# Patient Record
Sex: MALE | Race: OTHER | NOT HISPANIC OR LATINO
[De-identification: names, ages, dates, MRNs, and addresses within clinical notes are randomized per-mention and may not be internally consistent; named-entity substitution may affect disease eponyms.]

---

## 2017-03-13 ENCOUNTER — APPOINTMENT (OUTPATIENT)
Dept: INTERNAL MEDICINE | Facility: CLINIC | Age: 52
End: 2017-03-13

## 2017-03-13 VITALS
SYSTOLIC BLOOD PRESSURE: 152 MMHG | WEIGHT: 160 LBS | BODY MASS INDEX: 24.25 KG/M2 | HEIGHT: 68 IN | DIASTOLIC BLOOD PRESSURE: 84 MMHG | HEART RATE: 90 BPM | TEMPERATURE: 97.7 F | OXYGEN SATURATION: 96 %

## 2017-03-15 LAB
ALBUMIN SERPL ELPH-MCNC: 4.5 G/DL
ALP BLD-CCNC: 46 U/L
ALT SERPL-CCNC: 31 U/L
AMYLASE/CREAT SERPL: 83 U/L
ANION GAP SERPL CALC-SCNC: 20 MMOL/L
AST SERPL-CCNC: 25 U/L
BASOPHILS # BLD AUTO: 0.03 K/UL
BASOPHILS NFR BLD AUTO: 0.4 %
BILIRUB SERPL-MCNC: 0.4 MG/DL
BUN SERPL-MCNC: 12 MG/DL
CALCIUM SERPL-MCNC: 9.5 MG/DL
CHLORIDE SERPL-SCNC: 97 MMOL/L
CO2 SERPL-SCNC: 22 MMOL/L
CREAT SERPL-MCNC: 1.08 MG/DL
EOSINOPHIL # BLD AUTO: 0.3 K/UL
EOSINOPHIL NFR BLD AUTO: 4.2 %
ERYTHROCYTE [SEDIMENTATION RATE] IN BLOOD BY WESTERGREN METHOD: 2 MM/HR
GLUCOSE SERPL-MCNC: 74 MG/DL
HCT VFR BLD CALC: 42.7 %
HGB BLD-MCNC: 15.2 G/DL
IMM GRANULOCYTES NFR BLD AUTO: 0.1 %
LPL SERPL-CCNC: 34 U/L
LYMPHOCYTES # BLD AUTO: 3.83 K/UL
LYMPHOCYTES NFR BLD AUTO: 53 %
MAN DIFF?: NORMAL
MCHC RBC-ENTMCNC: 31.1 PG
MCHC RBC-ENTMCNC: 35.6 GM/DL
MCV RBC AUTO: 87.5 FL
MONOCYTES # BLD AUTO: 0.52 K/UL
MONOCYTES NFR BLD AUTO: 7.2 %
NEUTROPHILS # BLD AUTO: 2.53 K/UL
NEUTROPHILS NFR BLD AUTO: 35.1 %
PLATELET # BLD AUTO: 275 K/UL
POTASSIUM SERPL-SCNC: 4.2 MMOL/L
PROT SERPL-MCNC: 7.7 G/DL
RBC # BLD: 4.88 M/UL
RBC # FLD: 12.6 %
SAVE SPECIMEN: NORMAL
SODIUM SERPL-SCNC: 139 MMOL/L
WBC # FLD AUTO: 7.22 K/UL

## 2017-03-17 LAB
C DIFF TOX GENS STL QL NAA+PROBE: NORMAL
CDIFF BY PCR: NOT DETECTED
DEPRECATED O AND P PREP STL: NORMAL
G LAMBLIA AG STL QL: NORMAL
RV AG STL QL IA: NORMAL
WRIGHT STN STL: NORMAL

## 2017-03-20 LAB — BACTERIA STL CULT: NORMAL

## 2017-10-09 ENCOUNTER — APPOINTMENT (OUTPATIENT)
Dept: CARDIOLOGY | Facility: CLINIC | Age: 52
End: 2017-10-09
Payer: COMMERCIAL

## 2017-10-09 ENCOUNTER — LABORATORY RESULT (OUTPATIENT)
Age: 52
End: 2017-10-09

## 2017-10-09 VITALS — HEART RATE: 78 BPM | OXYGEN SATURATION: 96 % | TEMPERATURE: 98.1 F

## 2017-10-09 VITALS — DIASTOLIC BLOOD PRESSURE: 82 MMHG | SYSTOLIC BLOOD PRESSURE: 138 MMHG

## 2017-10-09 VITALS — DIASTOLIC BLOOD PRESSURE: 84 MMHG | SYSTOLIC BLOOD PRESSURE: 140 MMHG

## 2017-10-09 VITALS — DIASTOLIC BLOOD PRESSURE: 86 MMHG | SYSTOLIC BLOOD PRESSURE: 142 MMHG

## 2017-10-09 PROCEDURE — 36415 COLL VENOUS BLD VENIPUNCTURE: CPT

## 2017-10-09 PROCEDURE — 99214 OFFICE O/P EST MOD 30 MIN: CPT

## 2017-10-09 PROCEDURE — 93040 RHYTHM ECG WITH REPORT: CPT | Mod: 59

## 2017-10-09 PROCEDURE — 93000 ELECTROCARDIOGRAM COMPLETE: CPT

## 2017-10-10 ENCOUNTER — NON-APPOINTMENT (OUTPATIENT)
Age: 52
End: 2017-10-10

## 2017-10-26 ENCOUNTER — APPOINTMENT (OUTPATIENT)
Dept: CARDIOLOGY | Facility: CLINIC | Age: 52
End: 2017-10-26
Payer: COMMERCIAL

## 2017-10-26 ENCOUNTER — APPOINTMENT (OUTPATIENT)
Dept: CARDIOLOGY | Facility: CLINIC | Age: 52
End: 2017-10-26

## 2017-10-26 PROCEDURE — 93306 TTE W/DOPPLER COMPLETE: CPT

## 2017-10-26 PROCEDURE — 93015 CV STRESS TEST SUPVJ I&R: CPT

## 2017-10-29 LAB
ALBUMIN SERPL ELPH-MCNC: 4.6 G/DL
ALP BLD-CCNC: 51 U/L
ALT SERPL-CCNC: 28 U/L
AMYLASE/CREAT SERPL: 84 U/L
ANION GAP SERPL CALC-SCNC: 12 MMOL/L
AST SERPL-CCNC: 18 U/L
BASOPHILS # BLD AUTO: 0.02 K/UL
BASOPHILS NFR BLD AUTO: 0.3 %
BILIRUB SERPL-MCNC: 0.4 MG/DL
BUN SERPL-MCNC: 15 MG/DL
CALCIUM SERPL-MCNC: 9.2 MG/DL
CHLORIDE SERPL-SCNC: 102 MMOL/L
CHOLEST SERPL-MCNC: 244 MG/DL
CHOLEST/HDLC SERPL: 5.3 RATIO
CO2 SERPL-SCNC: 27 MMOL/L
CREAT SERPL-MCNC: 1.03 MG/DL
EOSINOPHIL # BLD AUTO: 0.28 K/UL
EOSINOPHIL NFR BLD AUTO: 4.5 %
GLUCOSE SERPL-MCNC: 90 MG/DL
HCT VFR BLD CALC: 42.4 %
HDLC SERPL-MCNC: 46 MG/DL
HGB BLD-MCNC: 14.4 G/DL
IMM GRANULOCYTES NFR BLD AUTO: 0 %
LDLC SERPL CALC-MCNC: 119 MG/DL
LDLC SERPL DIRECT ASSAY-MCNC: 146 MG/DL
LYMPHOCYTES # BLD AUTO: 3.21 K/UL
LYMPHOCYTES NFR BLD AUTO: 51.9 %
MAN DIFF?: NORMAL
MCHC RBC-ENTMCNC: 30.4 PG
MCHC RBC-ENTMCNC: 34 GM/DL
MCV RBC AUTO: 89.6 FL
MONOCYTES # BLD AUTO: 0.39 K/UL
MONOCYTES NFR BLD AUTO: 6.3 %
NEUTROPHILS # BLD AUTO: 2.28 K/UL
NEUTROPHILS NFR BLD AUTO: 37 %
PLATELET # BLD AUTO: 252 K/UL
POTASSIUM SERPL-SCNC: 4.7 MMOL/L
PROT SERPL-MCNC: 7.5 G/DL
RBC # BLD: 4.73 M/UL
RBC # FLD: 13.4 %
SODIUM SERPL-SCNC: 141 MMOL/L
T3 SERPL-MCNC: 81 NG/DL
T3RU NFR SERPL: 0.99 INDEX
T4 FREE SERPL-MCNC: 1.3 NG/DL
T4 SERPL-MCNC: 6.8 UG/DL
TRIGL SERPL-MCNC: 394 MG/DL
TSH SERPL-ACNC: 2.2 UIU/ML
WBC # FLD AUTO: 6.18 K/UL

## 2017-10-30 ENCOUNTER — APPOINTMENT (OUTPATIENT)
Dept: CARDIOLOGY | Facility: CLINIC | Age: 52
End: 2017-10-30

## 2017-11-02 ENCOUNTER — APPOINTMENT (OUTPATIENT)
Dept: CARDIOLOGY | Facility: CLINIC | Age: 52
End: 2017-11-02

## 2017-11-19 ENCOUNTER — RX RENEWAL (OUTPATIENT)
Age: 52
End: 2017-11-19

## 2018-01-08 ENCOUNTER — MESSAGE (OUTPATIENT)
Age: 53
End: 2018-01-08

## 2018-01-22 ENCOUNTER — APPOINTMENT (OUTPATIENT)
Dept: CARDIOLOGY | Facility: CLINIC | Age: 53
End: 2018-01-22

## 2018-02-13 ENCOUNTER — APPOINTMENT (OUTPATIENT)
Dept: CARDIOLOGY | Facility: CLINIC | Age: 53
End: 2018-02-13
Payer: COMMERCIAL

## 2018-02-13 VITALS
WEIGHT: 160 LBS | BODY MASS INDEX: 24.33 KG/M2 | HEART RATE: 81 BPM | DIASTOLIC BLOOD PRESSURE: 86 MMHG | SYSTOLIC BLOOD PRESSURE: 129 MMHG

## 2018-02-13 PROCEDURE — 99213 OFFICE O/P EST LOW 20 MIN: CPT | Mod: 25

## 2018-02-13 PROCEDURE — 93351 STRESS TTE COMPLETE: CPT

## 2018-02-19 LAB
ALBUMIN SERPL ELPH-MCNC: 4.4 G/DL
ALP BLD-CCNC: 47 U/L
ALT SERPL-CCNC: 22 U/L
ANION GAP SERPL CALC-SCNC: 17 MMOL/L
AST SERPL-CCNC: 22 U/L
BILIRUB SERPL-MCNC: 0.4 MG/DL
BUN SERPL-MCNC: 16 MG/DL
CALCIUM SERPL-MCNC: 9.2 MG/DL
CHLORIDE SERPL-SCNC: 96 MMOL/L
CHOLEST SERPL-MCNC: 236 MG/DL
CHOLEST/HDLC SERPL: 5.6 RATIO
CO2 SERPL-SCNC: 24 MMOL/L
CREAT SERPL-MCNC: 1.32 MG/DL
GLUCOSE SERPL-MCNC: 82 MG/DL
HDLC SERPL-MCNC: 42 MG/DL
LDLC SERPL CALC-MCNC: 126 MG/DL
LDLC SERPL DIRECT ASSAY-MCNC: 141 MG/DL
POTASSIUM SERPL-SCNC: 4.1 MMOL/L
PROT SERPL-MCNC: 8 G/DL
SODIUM SERPL-SCNC: 137 MMOL/L
TRIGL SERPL-MCNC: 339 MG/DL

## 2018-12-15 ENCOUNTER — RX RENEWAL (OUTPATIENT)
Age: 53
End: 2018-12-15

## 2020-05-07 ENCOUNTER — LABORATORY RESULT (OUTPATIENT)
Age: 55
End: 2020-05-07

## 2020-05-07 ENCOUNTER — APPOINTMENT (OUTPATIENT)
Dept: CARDIOLOGY | Facility: CLINIC | Age: 55
End: 2020-05-07
Payer: COMMERCIAL

## 2020-05-07 ENCOUNTER — NON-APPOINTMENT (OUTPATIENT)
Age: 55
End: 2020-05-07

## 2020-05-07 VITALS
SYSTOLIC BLOOD PRESSURE: 130 MMHG | DIASTOLIC BLOOD PRESSURE: 88 MMHG | BODY MASS INDEX: 27.37 KG/M2 | WEIGHT: 180 LBS | HEART RATE: 92 BPM | TEMPERATURE: 98.1 F | OXYGEN SATURATION: 96 %

## 2020-05-07 DIAGNOSIS — Z12.5 ENCOUNTER FOR SCREENING FOR MALIGNANT NEOPLASM OF PROSTATE: ICD-10-CM

## 2020-05-07 PROCEDURE — 36415 COLL VENOUS BLD VENIPUNCTURE: CPT

## 2020-05-07 PROCEDURE — 99214 OFFICE O/P EST MOD 30 MIN: CPT

## 2020-05-07 PROCEDURE — 93000 ELECTROCARDIOGRAM COMPLETE: CPT

## 2020-05-13 LAB
ALBUMIN SERPL ELPH-MCNC: 4.8 G/DL
ALP BLD-CCNC: 69 U/L
ALT SERPL-CCNC: 31 U/L
ANION GAP SERPL CALC-SCNC: 12 MMOL/L
AST SERPL-CCNC: 17 U/L
BASOPHILS # BLD AUTO: 0.05 K/UL
BASOPHILS NFR BLD AUTO: 0.7 %
BILIRUB SERPL-MCNC: 0.5 MG/DL
BUN SERPL-MCNC: 13 MG/DL
CALCIUM SERPL-MCNC: 9.8 MG/DL
CHLORIDE SERPL-SCNC: 101 MMOL/L
CHOLEST SERPL-MCNC: 155 MG/DL
CHOLEST/HDLC SERPL: 3.1 RATIO
CO2 SERPL-SCNC: 29 MMOL/L
CREAT SERPL-MCNC: 0.99 MG/DL
EOSINOPHIL # BLD AUTO: 0.24 K/UL
EOSINOPHIL NFR BLD AUTO: 3.6 %
GLUCOSE SERPL-MCNC: 111 MG/DL
HCT VFR BLD CALC: 42.4 %
HDLC SERPL-MCNC: 50 MG/DL
HGB BLD-MCNC: 14.8 G/DL
IMM GRANULOCYTES NFR BLD AUTO: 0.1 %
LDLC SERPL CALC-MCNC: 37 MG/DL
LDLC SERPL DIRECT ASSAY-MCNC: 66 MG/DL
LYMPHOCYTES # BLD AUTO: 2.96 K/UL
LYMPHOCYTES NFR BLD AUTO: 44.2 %
MAN DIFF?: NORMAL
MCHC RBC-ENTMCNC: 30.7 PG
MCHC RBC-ENTMCNC: 34.9 GM/DL
MCV RBC AUTO: 88 FL
MONOCYTES # BLD AUTO: 0.53 K/UL
MONOCYTES NFR BLD AUTO: 7.9 %
NEUTROPHILS # BLD AUTO: 2.91 K/UL
NEUTROPHILS NFR BLD AUTO: 43.5 %
PLATELET # BLD AUTO: 263 K/UL
POTASSIUM SERPL-SCNC: 4.4 MMOL/L
PROT SERPL-MCNC: 7.4 G/DL
PSA SERPL-MCNC: 0.5 NG/ML
RBC # BLD: 4.82 M/UL
RBC # FLD: 12.2 %
SODIUM SERPL-SCNC: 142 MMOL/L
T3 SERPL-MCNC: 87 NG/DL
T3RU NFR SERPL: 1.1 TBI
T4 FREE SERPL-MCNC: 1.3 NG/DL
T4 SERPL-MCNC: 6.8 UG/DL
TRIGL SERPL-MCNC: 342 MG/DL
TSH SERPL-ACNC: 1.82 UIU/ML
WBC # FLD AUTO: 6.7 K/UL

## 2020-05-28 NOTE — REASON FOR VISIT
[Follow-Up - Clinic] : a clinic follow-up of [Hyperlipidemia] : hyperlipidemia [Hypertension] : hypertension [Syncope] : syncope

## 2020-05-28 NOTE — HISTORY OF PRESENT ILLNESS
[FreeTextEntry1] : Patient presents to reestablish cardiac care with me..  \par Losartan had been increased from 50 daily to 100 daily, and he was started on hydrochlorothiazide and atorvastatin.  \par He has gained 10 to 15 pounds.  \par He was admitted to [facility] in September 2019 with an episode of syncope following initiation of a new medication.\par He denies chest pain, shortness of breath, palpitations, and dizziness.

## 2020-06-15 ENCOUNTER — APPOINTMENT (OUTPATIENT)
Dept: CARDIOLOGY | Facility: CLINIC | Age: 55
End: 2020-06-15

## 2020-10-29 ENCOUNTER — TRANSCRIPTION ENCOUNTER (OUTPATIENT)
Age: 55
End: 2020-10-29

## 2020-11-13 ENCOUNTER — TRANSCRIPTION ENCOUNTER (OUTPATIENT)
Age: 55
End: 2020-11-13

## 2021-03-02 ENCOUNTER — APPOINTMENT (OUTPATIENT)
Dept: CT IMAGING | Facility: IMAGING CENTER | Age: 56
End: 2021-03-02
Payer: COMMERCIAL

## 2021-03-02 ENCOUNTER — OUTPATIENT (OUTPATIENT)
Dept: OUTPATIENT SERVICES | Facility: HOSPITAL | Age: 56
LOS: 1 days | End: 2021-03-02
Payer: COMMERCIAL

## 2021-03-02 DIAGNOSIS — C02.1 MALIGNANT NEOPLASM OF BORDER OF TONGUE: ICD-10-CM

## 2021-03-02 PROCEDURE — 82565 ASSAY OF CREATININE: CPT

## 2021-03-02 PROCEDURE — 70487 CT MAXILLOFACIAL W/DYE: CPT | Mod: 26

## 2021-03-02 PROCEDURE — 70491 CT SOFT TISSUE NECK W/DYE: CPT

## 2021-03-02 PROCEDURE — 70491 CT SOFT TISSUE NECK W/DYE: CPT | Mod: 26

## 2021-03-02 PROCEDURE — 70487 CT MAXILLOFACIAL W/DYE: CPT

## 2021-03-04 ENCOUNTER — OUTPATIENT (OUTPATIENT)
Dept: OUTPATIENT SERVICES | Facility: HOSPITAL | Age: 56
LOS: 1 days | End: 2021-03-04
Payer: COMMERCIAL

## 2021-03-04 VITALS
DIASTOLIC BLOOD PRESSURE: 80 MMHG | WEIGHT: 173.94 LBS | RESPIRATION RATE: 16 BRPM | TEMPERATURE: 96 F | HEART RATE: 90 BPM | SYSTOLIC BLOOD PRESSURE: 130 MMHG | OXYGEN SATURATION: 99 % | HEIGHT: 64 IN

## 2021-03-04 DIAGNOSIS — I10 ESSENTIAL (PRIMARY) HYPERTENSION: ICD-10-CM

## 2021-03-04 DIAGNOSIS — C02.1 MALIGNANT NEOPLASM OF BORDER OF TONGUE: ICD-10-CM

## 2021-03-04 DIAGNOSIS — Z98.890 OTHER SPECIFIED POSTPROCEDURAL STATES: Chronic | ICD-10-CM

## 2021-03-04 LAB
ALBUMIN SERPL ELPH-MCNC: 4.6 G/DL — SIGNIFICANT CHANGE UP (ref 3.3–5)
ALP SERPL-CCNC: 70 U/L — SIGNIFICANT CHANGE UP (ref 40–120)
ALT FLD-CCNC: 30 U/L — SIGNIFICANT CHANGE UP (ref 4–41)
ANION GAP SERPL CALC-SCNC: 10 MMOL/L — SIGNIFICANT CHANGE UP (ref 7–14)
AST SERPL-CCNC: 18 U/L — SIGNIFICANT CHANGE UP (ref 4–40)
BILIRUB SERPL-MCNC: 0.6 MG/DL — SIGNIFICANT CHANGE UP (ref 0.2–1.2)
BLD GP AB SCN SERPL QL: NEGATIVE — SIGNIFICANT CHANGE UP
BUN SERPL-MCNC: 15 MG/DL — SIGNIFICANT CHANGE UP (ref 7–23)
CALCIUM SERPL-MCNC: 9 MG/DL — SIGNIFICANT CHANGE UP (ref 8.4–10.5)
CHLORIDE SERPL-SCNC: 100 MMOL/L — SIGNIFICANT CHANGE UP (ref 98–107)
CO2 SERPL-SCNC: 28 MMOL/L — SIGNIFICANT CHANGE UP (ref 22–31)
CREAT SERPL-MCNC: 0.99 MG/DL — SIGNIFICANT CHANGE UP (ref 0.5–1.3)
GLUCOSE SERPL-MCNC: 85 MG/DL — SIGNIFICANT CHANGE UP (ref 70–99)
POTASSIUM SERPL-MCNC: 4.2 MMOL/L — SIGNIFICANT CHANGE UP (ref 3.5–5.3)
POTASSIUM SERPL-SCNC: 4.2 MMOL/L — SIGNIFICANT CHANGE UP (ref 3.5–5.3)
PROT SERPL-MCNC: 7.5 G/DL — SIGNIFICANT CHANGE UP (ref 6–8.3)
RH IG SCN BLD-IMP: POSITIVE — SIGNIFICANT CHANGE UP
SODIUM SERPL-SCNC: 138 MMOL/L — SIGNIFICANT CHANGE UP (ref 135–145)

## 2021-03-04 PROCEDURE — 93010 ELECTROCARDIOGRAM REPORT: CPT

## 2021-03-04 RX ORDER — LOSARTAN POTASSIUM 100 MG/1
0 TABLET, FILM COATED ORAL
Qty: 0 | Refills: 0 | DISCHARGE

## 2021-03-04 RX ORDER — ATORVASTATIN CALCIUM 80 MG/1
1 TABLET, FILM COATED ORAL
Qty: 0 | Refills: 0 | DISCHARGE

## 2021-03-04 NOTE — H&P PST ADULT - NEGATIVE CARDIOVASCULAR SYMPTOMS
Patient with refills on file, note sent to the pharmacy.  
no chest pain/no palpitations/no dyspnea on exertion/no peripheral edema

## 2021-03-04 NOTE — H&P PST ADULT - NSICDXPROBLEM_GEN_ALL_CORE_FT
PROBLEM DIAGNOSES  Problem: Malignant neoplasm of border of tongue  Assessment and Plan: preop for right selective neck dissection, right partial glossectomy on 3/11/21  preop instructions given, pt verbalized understanding  GI prophylaxis and chlorhexidine wash provided  pt informed COVID testing must be done 72 hours prior to surgery  YEMI precautions, OR booking notified via fax      Problem: HTN (hypertension)  Assessment and Plan: pt will take blood pressure meds Amlodipine and Losartan AM of surgery as prescribed

## 2021-03-04 NOTE — H&P PST ADULT - NEGATIVE ENMT SYMPTOMS
no hearing difficulty/no ear pain/no tinnitus/no vertigo/no sinus symptoms/no nose bleeds/no gum bleeding/no throat pain/no dysphagia

## 2021-03-04 NOTE — H&P PST ADULT - NSANTHOSAYNRD_GEN_A_CORE
No. YEMI screening performed.  STOP BANG Legend: 0-2 = LOW Risk; 3-4 = INTERMEDIATE Risk; 5-8 = HIGH Risk

## 2021-03-04 NOTE — H&P PST ADULT - NSICDXPASTMEDICALHX_GEN_ALL_CORE_FT
PAST MEDICAL HISTORY:  HTN (hypertension)     Hypercholesteremia     Malignant neoplasm of border of tongue     Mitral regurgitation     Near syncope 9/2019 after starting new blood pressure medication.  pt states cardiac testing was WNL    Premature ventricular contraction

## 2021-03-04 NOTE — H&P PST ADULT - HISTORY OF PRESENT ILLNESS
56 y/o male presents to [facility] preop for right selective neck dissection, right partial glossectomy. preop dx of malignant neoplasm of border of tongue. pt presented to the dentist reporting burning sensation to tongue after eating spicy foods x 8 months. pt saw oral surgeon Dr. Beckwith and had a biopsy performed. biopsy results confirmed malignancy.

## 2021-03-08 ENCOUNTER — APPOINTMENT (OUTPATIENT)
Dept: DISASTER EMERGENCY | Facility: CLINIC | Age: 56
End: 2021-03-08

## 2021-03-09 ENCOUNTER — NON-APPOINTMENT (OUTPATIENT)
Age: 56
End: 2021-03-09

## 2021-03-09 ENCOUNTER — APPOINTMENT (OUTPATIENT)
Dept: CARDIOLOGY | Facility: CLINIC | Age: 56
End: 2021-03-09
Payer: COMMERCIAL

## 2021-03-09 ENCOUNTER — RESULT CHARGE (OUTPATIENT)
Age: 56
End: 2021-03-09

## 2021-03-09 VITALS
WEIGHT: 176 LBS | HEART RATE: 75 BPM | SYSTOLIC BLOOD PRESSURE: 130 MMHG | OXYGEN SATURATION: 99 % | BODY MASS INDEX: 26.76 KG/M2 | TEMPERATURE: 97 F | DIASTOLIC BLOOD PRESSURE: 84 MMHG

## 2021-03-09 DIAGNOSIS — Z01.818 ENCOUNTER FOR OTHER PREPROCEDURAL EXAMINATION: ICD-10-CM

## 2021-03-09 DIAGNOSIS — Z87.19 PERSONAL HISTORY OF OTHER DISEASES OF THE DIGESTIVE SYSTEM: ICD-10-CM

## 2021-03-09 DIAGNOSIS — M25.562 PAIN IN LEFT KNEE: ICD-10-CM

## 2021-03-09 DIAGNOSIS — Z87.898 PERSONAL HISTORY OF OTHER SPECIFIED CONDITIONS: ICD-10-CM

## 2021-03-09 DIAGNOSIS — R94.2 ABNORMAL RESULTS OF PULMONARY FUNCTION STUDIES: ICD-10-CM

## 2021-03-09 DIAGNOSIS — Z87.2 PERSONAL HISTORY OF DISEASES OF THE SKIN AND SUBCUTANEOUS TISSUE: ICD-10-CM

## 2021-03-09 DIAGNOSIS — R10.13 EPIGASTRIC PAIN: ICD-10-CM

## 2021-03-09 PROBLEM — R55 SYNCOPE AND COLLAPSE: Chronic | Status: ACTIVE | Noted: 2021-03-04

## 2021-03-09 PROBLEM — I10 ESSENTIAL (PRIMARY) HYPERTENSION: Chronic | Status: ACTIVE | Noted: 2021-03-04

## 2021-03-09 PROBLEM — I49.3 VENTRICULAR PREMATURE DEPOLARIZATION: Chronic | Status: ACTIVE | Noted: 2021-03-04

## 2021-03-09 PROBLEM — C02.1: Chronic | Status: ACTIVE | Noted: 2021-03-04

## 2021-03-09 PROBLEM — E78.00 PURE HYPERCHOLESTEROLEMIA, UNSPECIFIED: Chronic | Status: ACTIVE | Noted: 2021-03-04

## 2021-03-09 PROBLEM — I34.0 NONRHEUMATIC MITRAL (VALVE) INSUFFICIENCY: Chronic | Status: ACTIVE | Noted: 2021-03-04

## 2021-03-09 LAB — SARS-COV-2 N GENE NPH QL NAA+PROBE: NOT DETECTED

## 2021-03-09 PROCEDURE — 99072 ADDL SUPL MATRL&STAF TM PHE: CPT

## 2021-03-09 PROCEDURE — 99214 OFFICE O/P EST MOD 30 MIN: CPT

## 2021-03-09 PROCEDURE — 93000 ELECTROCARDIOGRAM COMPLETE: CPT

## 2021-03-09 PROCEDURE — 93040 RHYTHM ECG WITH REPORT: CPT | Mod: 59

## 2021-03-10 ENCOUNTER — NON-APPOINTMENT (OUTPATIENT)
Age: 56
End: 2021-03-10

## 2021-03-10 PROBLEM — Z01.818 PRE-OP EXAM: Status: RESOLVED | Noted: 2021-03-07 | Resolved: 2021-03-10

## 2021-03-10 PROBLEM — Z87.19 HISTORY OF GASTROENTERITIS: Status: RESOLVED | Noted: 2017-03-13 | Resolved: 2021-03-10

## 2021-03-10 PROBLEM — R10.13 EPIGASTRIC DISCOMFORT: Status: RESOLVED | Noted: 2017-10-09 | Resolved: 2021-03-10

## 2021-03-10 RX ORDER — HYDROCHLOROTHIAZIDE 25 MG/1
25 TABLET ORAL DAILY
Qty: 90 | Refills: 3 | Status: DISCONTINUED | COMMUNITY
End: 2021-03-10

## 2021-03-10 NOTE — H&P ADULT - HISTORY OF PRESENT ILLNESS
54 y/o male presents to [facility] for right selective neck dissection, right partial glossectomy after biopsy/ diagnosis of oral cancer with Dr. Soliman in OR under [place] on 03/10/2021.

## 2021-03-10 NOTE — H&P ADULT - NSHPPHYSICALEXAM_GEN_ALL_CORE
Physical Exam:  · Constitutional		well-developed; well-groomed; well-nourished; no distress  · Eyes		PERRL; EOMI; conjunctiva clear  · ENMT	mouth	normal mouth and gums; moist  · ENMT Comments	ulcerated lesion to right lateral tongue  · Neck		normal; supple; no JVD; full ROM of neck  · Breasts           normal shape  · Back		normal shape; ROM intact; strength intact  · Respiratory	detailed exam  · Respiratory Details	airway patent; breath sounds equal; respirations non-labored; clear to auscultation bilaterally; no wheezes  · Cardiovascular	           regular rate and rhythm  positive S1; positive S2  · Gastrointestinal	soft; nontender; no masses palpable; bowel sounds normal  · Genitourinary	not examined   · Rectal	not examined   · Extremities	 	no pedal edema  · Vascular	detailed exam   · Radial Pulse	right normal; left normal  · Neurological	Alert & oriented; no sensory, motor or coordination deficits, normal reflexes  · Skin	No lesions; no rash  · Lymph Nodes		no cervical lymphadenopathy palpated  · Musculoskeletal		ROM intact; no calf tenderness; normal strength  · Psychiatric	Affect and characteristics of appearance, verbalizations, behaviors are appropriate

## 2021-03-10 NOTE — HISTORY OF PRESENT ILLNESS
[FreeTextEntry1] : He was admitted to [facility] in September 2019 for syncope following initiation of a new medication; he has been unable to determine the identity of the medication.\par He stopped taking hydrochlorothiazide since his last visit with me in May 2020.  \par He walks 1-2 miles per day without any symptoms, but does not formally exercise.\par Review of systems is negative.  Specifically, he denies chest pain, shortness of breath, palpitations, and dizziness.\par Prior cardiac work-up includes the following:\par Echocardiogram (10/26/2017) - LVEF 65-70%, mild MR\par Stress echocardiogram (2/13/2018) - 10'45" seconds Eric protocol (12 METS); 1.8 mm downsloping ST depressions III and 1.2 mm horizontal ST depressions aVF at peak exercise, resolving by 1 minute recovery; no echocardiographic evidence of ischemia.\par

## 2021-03-10 NOTE — H&P ADULT - NSHPREVIEWOFSYSTEMS_GEN_ALL_CORE
Review of Systems:   · Negative General Symptoms	no fever; no chills; no sweating; no weight loss; no fatigue  · Skin/Breast	negative  · Ophthalmologic	negative  · Negative ENMT Symptoms	no hearing difficulty; no ear pain; no tinnitus; no vertigo; no sinus symptoms; no nose bleeds; no gum bleeding; no throat pain; no dysphagia  · Negative Respiratory and Thorax Symptoms	no wheezing; no dyspnea; no cough; no hemoptysis  · Negative Cardiovascular Symptoms	no chest pain; no palpitations; no dyspnea on exertion; no peripheral edema  · Gastrointestinal	negative  · Genitourinary	negative  · Musculoskeletal	negative  · Negative Neurological Symptoms	no transient paralysis; no weakness; no paresthesias; no generalized seizures; no focal seizures; no syncope; no tremors; no vertigo; no loss of sensation; no difficulty walking; no headache; no loss of consciousness; no hemiparesis; no confusion; no facial palsy  · Psychiatric	negative  · Hematology/Lymphatics	negative  · Endocrine Comments	denies thyroid disease or DM  · Allergic/Immunologic	negative

## 2021-03-10 NOTE — H&P ADULT - NSHPSOCIALHISTORY_GEN_ALL_CORE
Social History:  · Marital Status	  · Occupation	networking services  · Lives With	children; spouse     Substance Use History:  · Substance Use	caffeine  · Caffeine Type	coffee  · Caffeine Amount/Frequency	1-2 cups/cans per day  · Substance Use Comment	denies drug use     Alcohol Use History:  · Have you ever consumed alcohol	yes...  · Alcohol Type	beer; wine; liquor  · Alcohol Frequency	2-4 times/mo  1x/month  · Alcohol Amount	1-2 drinks  · Problems Related to Alcohol Use	no  · 1. Have you felt you ought to CUT down on your drinking?	no  · 2. Have people ANNOYED you by criticizing your drinking?	no  · 3. Have you ever felt bad or GUILTY about your drinking?	no  · 4. Have you ever needed an "EYE OPENER", a drink first thing in the morning to steady your nerves or get rid of a hangover?	no     Tobacco Usage:  · Tobacco Usage: Never smoker     Passive Smoke Exposure:  · Passive Smoke Exposure	No

## 2021-03-10 NOTE — H&P ADULT - ASSESSMENT
56 y/o male presents to [facility] for right selective neck dissection, right partial glossectomy after biopsy/ diagnosis of oral cancer with Dr. Soliman in OR under [place] on 03/10/2021.

## 2021-03-10 NOTE — REASON FOR VISIT
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

## 2021-03-10 NOTE — ASU PATIENT PROFILE, ADULT - PMH
HTN (hypertension)    Hypercholesteremia    Malignant neoplasm of border of tongue    Mitral regurgitation    Near syncope  9/2019 after starting new blood pressure medication.  pt states cardiac testing was WNL  Premature ventricular contraction

## 2021-03-11 ENCOUNTER — INPATIENT (INPATIENT)
Facility: HOSPITAL | Age: 56
LOS: 2 days | Discharge: HOME | End: 2021-03-14
Attending: DENTIST | Admitting: OTOLARYNGOLOGY
Payer: COMMERCIAL

## 2021-03-11 ENCOUNTER — RESULT REVIEW (OUTPATIENT)
Age: 56
End: 2021-03-11

## 2021-03-11 VITALS
SYSTOLIC BLOOD PRESSURE: 123 MMHG | RESPIRATION RATE: 16 BRPM | OXYGEN SATURATION: 99 % | TEMPERATURE: 98 F | HEART RATE: 70 BPM | HEIGHT: 64 IN | DIASTOLIC BLOOD PRESSURE: 81 MMHG | WEIGHT: 173.94 LBS

## 2021-03-11 DIAGNOSIS — Z98.890 OTHER SPECIFIED POSTPROCEDURAL STATES: Chronic | ICD-10-CM

## 2021-03-11 DIAGNOSIS — C02.1 MALIGNANT NEOPLASM OF BORDER OF TONGUE: ICD-10-CM

## 2021-03-11 PROCEDURE — 88309 TISSUE EXAM BY PATHOLOGIST: CPT | Mod: 26

## 2021-03-11 PROCEDURE — 88331 PATH CONSLTJ SURG 1 BLK 1SPC: CPT | Mod: 26

## 2021-03-11 PROCEDURE — 88332 PATH CONSLTJ SURG EA ADD BLK: CPT | Mod: 26

## 2021-03-11 RX ORDER — ACETAMINOPHEN 500 MG
1000 TABLET ORAL EVERY 6 HOURS
Refills: 0 | Status: COMPLETED | OUTPATIENT
Start: 2021-03-11 | End: 2021-03-12

## 2021-03-11 RX ORDER — AMLODIPINE BESYLATE 2.5 MG/1
1 TABLET ORAL
Qty: 0 | Refills: 0 | DISCHARGE

## 2021-03-11 RX ORDER — ATORVASTATIN CALCIUM 80 MG/1
1 TABLET, FILM COATED ORAL
Qty: 0 | Refills: 0 | DISCHARGE

## 2021-03-11 RX ORDER — LOSARTAN POTASSIUM 100 MG/1
0 TABLET, FILM COATED ORAL
Qty: 0 | Refills: 0 | DISCHARGE

## 2021-03-11 RX ORDER — FENTANYL CITRATE 50 UG/ML
50 INJECTION INTRAVENOUS
Refills: 0 | Status: DISCONTINUED | OUTPATIENT
Start: 2021-03-11 | End: 2021-03-12

## 2021-03-11 RX ORDER — AMPICILLIN SODIUM AND SULBACTAM SODIUM 250; 125 MG/ML; MG/ML
3 INJECTION, POWDER, FOR SUSPENSION INTRAMUSCULAR; INTRAVENOUS ONCE
Refills: 0 | Status: COMPLETED | OUTPATIENT
Start: 2021-03-11 | End: 2021-03-11

## 2021-03-11 RX ORDER — ENOXAPARIN SODIUM 100 MG/ML
40 INJECTION SUBCUTANEOUS ONCE
Refills: 0 | Status: DISCONTINUED | OUTPATIENT
Start: 2021-03-11 | End: 2021-03-11

## 2021-03-11 RX ORDER — SODIUM CHLORIDE 9 MG/ML
1000 INJECTION, SOLUTION INTRAVENOUS
Refills: 0 | Status: DISCONTINUED | OUTPATIENT
Start: 2021-03-11 | End: 2021-03-12

## 2021-03-11 RX ORDER — AMPICILLIN SODIUM AND SULBACTAM SODIUM 250; 125 MG/ML; MG/ML
3 INJECTION, POWDER, FOR SUSPENSION INTRAMUSCULAR; INTRAVENOUS EVERY 6 HOURS
Refills: 0 | Status: DISCONTINUED | OUTPATIENT
Start: 2021-03-11 | End: 2021-03-14

## 2021-03-11 RX ORDER — AMPICILLIN SODIUM AND SULBACTAM SODIUM 250; 125 MG/ML; MG/ML
INJECTION, POWDER, FOR SUSPENSION INTRAMUSCULAR; INTRAVENOUS
Refills: 0 | Status: DISCONTINUED | OUTPATIENT
Start: 2021-03-11 | End: 2021-03-14

## 2021-03-11 RX ORDER — HYDROMORPHONE HYDROCHLORIDE 2 MG/ML
0.5 INJECTION INTRAMUSCULAR; INTRAVENOUS; SUBCUTANEOUS
Refills: 0 | Status: DISCONTINUED | OUTPATIENT
Start: 2021-03-11 | End: 2021-03-12

## 2021-03-11 RX ORDER — DEXAMETHASONE 0.5 MG/5ML
10 ELIXIR ORAL ONCE
Refills: 0 | Status: COMPLETED | OUTPATIENT
Start: 2021-03-11 | End: 2021-03-11

## 2021-03-11 RX ORDER — METOCLOPRAMIDE HCL 10 MG
10 TABLET ORAL ONCE
Refills: 0 | Status: DISCONTINUED | OUTPATIENT
Start: 2021-03-11 | End: 2021-03-12

## 2021-03-11 RX ORDER — HYDROMORPHONE HYDROCHLORIDE 2 MG/ML
1 INJECTION INTRAMUSCULAR; INTRAVENOUS; SUBCUTANEOUS
Refills: 0 | Status: DISCONTINUED | OUTPATIENT
Start: 2021-03-11 | End: 2021-03-12

## 2021-03-11 RX ADMIN — SODIUM CHLORIDE 75 MILLILITER(S): 9 INJECTION, SOLUTION INTRAVENOUS at 14:45

## 2021-03-11 RX ADMIN — FENTANYL CITRATE 50 MICROGRAM(S): 50 INJECTION INTRAVENOUS at 17:37

## 2021-03-11 RX ADMIN — AMPICILLIN SODIUM AND SULBACTAM SODIUM 200 GRAM(S): 250; 125 INJECTION, POWDER, FOR SUSPENSION INTRAMUSCULAR; INTRAVENOUS at 18:57

## 2021-03-11 RX ADMIN — Medication 102 MILLIGRAM(S): at 20:34

## 2021-03-11 RX ADMIN — FENTANYL CITRATE 50 MICROGRAM(S): 50 INJECTION INTRAVENOUS at 17:07

## 2021-03-11 RX ADMIN — FENTANYL CITRATE 50 MICROGRAM(S): 50 INJECTION INTRAVENOUS at 18:15

## 2021-03-11 RX ADMIN — FENTANYL CITRATE 50 MICROGRAM(S): 50 INJECTION INTRAVENOUS at 20:04

## 2021-03-11 RX ADMIN — FENTANYL CITRATE 50 MICROGRAM(S): 50 INJECTION INTRAVENOUS at 17:45

## 2021-03-11 RX ADMIN — FENTANYL CITRATE 50 MICROGRAM(S): 50 INJECTION INTRAVENOUS at 20:15

## 2021-03-11 NOTE — BRIEF OPERATIVE NOTE - NSICDXBRIEFPROCEDURE_GEN_ALL_CORE_FT
PROCEDURES:  Modified radical neck dissection 11-Mar-2021 17:17:33  López Sandoval  Partial glossectomy 11-Mar-2021 17:16:58  López Sandoval

## 2021-03-11 NOTE — CONSULT NOTE ADULT - ASSESSMENT
55M hx of HTN, HLD, mitral regurgitation, and tongue cancer now s/p elective R partial glossectomy and R modified lymph node dissection. SICU consulted for airway watch.    NEUROLOGIC:  - Pain control: Fentanyl    RESPIRATORY:  - 3L NC    CARDIVASCULAR:    GASTROINTESTINAL:    /RENAL:    HEMATOLOGIC:    INFECTIOUS DISEASE:    ENDOCRINE:    LINES:    DISPO:  - SICU       55M hx of HTN, HLD, mitral regurgitation, and tongue cancer now s/p elective R partial glossectomy and R modified lymph node dissection. SICU consulted for airway watch.    PLAN:  NEUROLOGIC:  - Pain control: IV Tylenol PRN, Dilaudid PRN    RESPIRATORY:  - 3L NC, satting >95%    CARDIOVASCULAR:  - Monitor hemodynamics    GASTROINTESTINAL:  - NPO    /RENAL:  - LR @ 75  - Monitor I&O    HEMATOLOGIC:  - SCDs for VTE ppx    INFECTIOUS DISEASE:  - Perioperative Unasyn    ENDOCRINE:  - F/u glucose levels on BMP    DISPO:  - SICU       55M hx of HTN, HLD, mitral regurgitation, and tongue cancer now s/p elective R partial glossectomy and R modified lymph node dissection. SICU consulted for airway watch.    PLAN:  NEUROLOGIC:  - Pain control: IV Tylenol PRN, Dilaudid PRN    RESPIRATORY:  - At RA satting 100%    CARDIOVASCULAR:  - Monitor hemodynamics    GASTROINTESTINAL:  - NPO    /RENAL:  - LR @ 75  - Monitor I&O    HEMATOLOGIC:  - SCDs for VTE ppx    INFECTIOUS DISEASE:  - Perioperative Unasyn    ENDOCRINE:  - F/u glucose levels on BMP    DISPO:  - SICU

## 2021-03-11 NOTE — CONSULT NOTE ADULT - SUBJECTIVE AND OBJECTIVE BOX
HISTORY  55M hx of HTN, HLD, mitral regurgitation, and tongue cancer now s/p elective R partial glossectomy and R modified lymph node dissection. SICU consulted for airway watch.    SUBJECTIVE/ROS:  [x] A ten-point review of systems was otherwise negative except as noted.  [ ] Due to altered mental status/intubation, subjective information were not able to be obtained from the patient. History was obtained, to the extent possible, from review of the chart and collateral sources of information.      NEURO  RASS:     GCS: 15     CAM ICU:  Exam: lethargic  Meds: fentaNYL    Injectable 50 MICROGram(s) IV Push every 5 minutes PRN Severe Pain (7 - 10)  metoclopramide Injectable 10 milliGRAM(s) IV Push once PRN Nausea and/or Vomiting    [x] Adequacy of sedation and pain control has been assessed and adjusted      RESPIRATORY  RR: 13 (03-11-21 @ 16:00) (13 - 20)  SpO2: 97% (03-11-21 @ 16:00) (94% - 99%)  Wt(kg): --  Exam: unlabored, clear to auscultation bilaterally  Mechanical Ventilation:     [N/A] Extubation Readiness Assessed  Meds:       CARDIOVASCULAR  HR: 88 (03-11-21 @ 16:00) (70 - 99)  BP: 128/71 (03-11-21 @ 16:00) (111/65 - 133/74)  BP(mean): 82 (03-11-21 @ 16:00) (76 - 87)  ABP: --  ABP(mean): --  Wt(kg): --  CVP(cm H2O): --      Exam: regular rate and rhythm  Cardiac Rhythm: sinus  Perfusion     [x]Adequate   [ ]Inadequate  Mentation   [x]Normal       [ ]Reduced  Extremities  [x]Warm         [ ]Cool  Volume Status [ ]Hypervolemic [x]Euvolemic [ ]Hypovolemic  Meds:       GI/NUTRITION  Exam: soft, nontender, nondistended  Diet: NPO  Meds:     GENITOURINARY  I&O's Detail    03-11 @ 07:01  -  03-11 @ 16:56  --------------------------------------------------------  IN:  Total IN: 0 mL    OUT:    Bulb (mL): 25 mL  Total OUT: 25 mL    Total NET: -25 mL        Weight (kg): 78.9 (03-11 @ 06:36)        [ ] العلي catheter, indication: N/A  Meds: lactated ringers. 1000 milliLiter(s) IV Continuous <Continuous>        HEMATOLOGIC  Meds: enoxaparin Injectable 40 milliGRAM(s) SubCutaneous once    [x] VTE Prophylaxis      Transfusion     [ ] PRBC   [ ] Platelets   [ ] FFP   [ ] Cryoprecipitate      INFECTIOUS DISEASES    RECENT CULTURES:    Meds: ampicillin/sulbactam  IVPB 3 Gram(s) IV Intermittent once  ampicillin/sulbactam  IVPB 3 Gram(s) IV Intermittent every 6 hours  ampicillin/sulbactam  IVPB            ENDOCRINE  CAPILLARY BLOOD GLUCOSE        Meds: dexAMETHasone  IVPB 10 milliGRAM(s) IV Intermittent once        ACCESS DEVICES:  [ ] Peripheral IV  [ ] Central Venous Line	[ ] R	[ ] L	[ ] IJ	[ ] Fem	[ ] SC	Placed:   [ ] Arterial Line		[ ] R	[ ] L	[ ] Fem	[ ] Rad	[ ] Ax	Placed:   [ ] PICC:					[ ] Mediport  [ ] Urinary Catheter, Date Placed:   [x] Necessity of urinary, arterial, and venous catheters discussed    OTHER MEDICATIONS:      CODE STATUS:      IMAGING: HISTORY  55M hx of HTN, HLD, mitral regurgitation, and tongue cancer now s/p elective R partial glossectomy and R modified lymph node dissection. SICU consulted for airway watch.    SUBJECTIVE/ROS:  [x] A ten-point review of systems was otherwise negative except as noted.  [ ] Due to altered mental status/intubation, subjective information were not able to be obtained from the patient. History was obtained, to the extent possible, from review of the chart and collateral sources of information.      NEURO  RASS:     GCS: 15     CAM ICU:  Exam: lethargic  Meds: fentaNYL    Injectable 50 MICROGram(s) IV Push every 5 minutes PRN Severe Pain (7 - 10)  metoclopramide Injectable 10 milliGRAM(s) IV Push once PRN Nausea and/or Vomiting    [x] Adequacy of sedation and pain control has been assessed and adjusted      RESPIRATORY  RR: 13 (03-11-21 @ 16:00) (13 - 20)  SpO2: 97% (03-11-21 @ 16:00) (94% - 99%)  Wt(kg): --  Exam: unlabored, clear to auscultation bilaterally  Mechanical Ventilation:     [N/A] Extubation Readiness Assessed  Meds:       CARDIOVASCULAR  HR: 88 (03-11-21 @ 16:00) (70 - 99)  BP: 128/71 (03-11-21 @ 16:00) (111/65 - 133/74)  BP(mean): 82 (03-11-21 @ 16:00) (76 - 87)  ABP: --  ABP(mean): --  Wt(kg): --  CVP(cm H2O): --      Exam: regular rate and rhythm  Cardiac Rhythm: sinus  Perfusion     [x]Adequate   [ ]Inadequate  Mentation   [x]Normal       [ ]Reduced  Extremities  [x]Warm         [ ]Cool  Volume Status [ ]Hypervolemic [x]Euvolemic [ ]Hypovolemic  Meds:       GI/NUTRITION  Exam: soft, nontender, nondistended  Diet: NPO  Meds:     GENITOURINARY  I&O's Detail    03-11 @ 07:01  -  03-11 @ 16:56  --------------------------------------------------------  IN:  Total IN: 0 mL    OUT:    Bulb (mL): 25 mL  Total OUT: 25 mL    Total NET: -25 mL    Weight (kg): 78.9 (03-11 @ 06:36)    [ ] العلي catheter, indication: N/A  Meds: lactated ringers. 1000 milliLiter(s) IV Continuous <Continuous>    HEMATOLOGIC  Meds: enoxaparin Injectable 40 milliGRAM(s) SubCutaneous once    [x] VTE Prophylaxis  Transfusion     [ ] PRBC   [ ] Platelets   [ ] FFP   [ ] Cryoprecipitate      INFECTIOUS DISEASES    RECENT CULTURES:    Meds: ampicillin/sulbactam  IVPB 3 Gram(s) IV Intermittent once  ampicillin/sulbactam  IVPB 3 Gram(s) IV Intermittent every 6 hours  ampicillin/sulbactam  IVPB        ENDOCRINE  Meds: dexAMETHasone  IVPB 10 milliGRAM(s) IV Intermittent once    ACCESS DEVICES:  [ ] Peripheral IV  [ ] Central Venous Line	[ ] R	[ ] L	[ ] IJ	[ ] Fem	[ ] SC	Placed:   [ ] Arterial Line		[ ] R	[ ] L	[ ] Fem	[ ] Rad	[ ] Ax	Placed:   [ ] PICC:					[ ] Mediport  [ ] Urinary Catheter, Date Placed:   [x] Necessity of urinary, arterial, and venous catheters discussed     HISTORY  55M hx of HTN, HLD, mitral regurgitation, and tongue cancer now s/p elective R partial glossectomy and R modified lymph node dissection. SICU consulted for airway watch.    SUBJECTIVE/ROS:  [x] A ten-point review of systems was otherwise negative except as noted.  [ ] Due to altered mental status/intubation, subjective information were not able to be obtained from the patient. History was obtained, to the extent possible, from review of the chart and collateral sources of information.      NEURO  RASS:     GCS: 15     CAM ICU:  Exam: lethargic  Meds: fentaNYL    Injectable 50 MICROGram(s) IV Push every 5 minutes PRN Severe Pain (7 - 10)  metoclopramide Injectable 10 milliGRAM(s) IV Push once PRN Nausea and/or Vomiting    [x] Adequacy of sedation and pain control has been assessed and adjusted      RESPIRATORY  RR: 13 (03-11-21 @ 16:00) (13 - 20)  SpO2: 97% (03-11-21 @ 16:00) (94% - 99%)  Wt(kg): --     [N/A] Extubation Readiness Assessed  Meds:     CARDIOVASCULAR  HR: 88 (03-11-21 @ 16:00) (70 - 99)  BP: 128/71 (03-11-21 @ 16:00) (111/65 - 133/74)  BP(mean): 82 (03-11-21 @ 16:00) (76 - 87)  ABP: --  ABP(mean): --  Wt(kg): --  CVP(cm H2O): --      Exam: regular rate and rhythm  Cardiac Rhythm: sinus  Perfusion     [x]Adequate   [ ]Inadequate  Mentation   [x]Normal       [ ]Reduced  Extremities  [x]Warm         [ ]Cool  Volume Status [ ]Hypervolemic [x]Euvolemic [ ]Hypovolemic  Meds:       GI/NUTRITION  Exam: soft, nontender, nondistended  Diet: NPO  Meds:     GENITOURINARY  I&O's Detail    03-11 @ 07:01  -  03-11 @ 16:56  --------------------------------------------------------  IN:  Total IN: 0 mL    OUT:    Bulb (mL): 25 mL  Total OUT: 25 mL    Total NET: -25 mL    Weight (kg): 78.9 (03-11 @ 06:36)    [ ] العلي catheter, indication: N/A  Meds: lactated ringers. 1000 milliLiter(s) IV Continuous <Continuous>    HEMATOLOGIC  Meds: enoxaparin Injectable 40 milliGRAM(s) SubCutaneous once    [x] VTE Prophylaxis  Transfusion     [ ] PRBC   [ ] Platelets   [ ] FFP   [ ] Cryoprecipitate      INFECTIOUS DISEASES    RECENT CULTURES:    Meds: ampicillin/sulbactam  IVPB 3 Gram(s) IV Intermittent once  ampicillin/sulbactam  IVPB 3 Gram(s) IV Intermittent every 6 hours  ampicillin/sulbactam  IVPB        ENDOCRINE  Meds: dexAMETHasone  IVPB 10 milliGRAM(s) IV Intermittent once    ACCESS DEVICES:  [ ] Peripheral IV  [ ] Central Venous Line	[ ] R	[ ] L	[ ] IJ	[ ] Fem	[ ] SC	Placed:   [ ] Arterial Line		[ ] R	[ ] L	[ ] Fem	[ ] Rad	[ ] Ax	Placed:   [ ] PICC:					[ ] Mediport  [ ] Urinary Catheter, Date Placed:   [x] Necessity of urinary, arterial, and venous catheters discussed

## 2021-03-12 LAB
ANION GAP SERPL CALC-SCNC: 10 MMOL/L — SIGNIFICANT CHANGE UP (ref 7–14)
BUN SERPL-MCNC: 13 MG/DL — SIGNIFICANT CHANGE UP (ref 7–23)
CALCIUM SERPL-MCNC: 8.7 MG/DL — SIGNIFICANT CHANGE UP (ref 8.4–10.5)
CHLORIDE SERPL-SCNC: 103 MMOL/L — SIGNIFICANT CHANGE UP (ref 98–107)
CO2 SERPL-SCNC: 26 MMOL/L — SIGNIFICANT CHANGE UP (ref 22–31)
CREAT SERPL-MCNC: 1.02 MG/DL — SIGNIFICANT CHANGE UP (ref 0.5–1.3)
GLUCOSE SERPL-MCNC: 149 MG/DL — HIGH (ref 70–99)
HCT VFR BLD CALC: 39.3 % — SIGNIFICANT CHANGE UP (ref 39–50)
HGB BLD-MCNC: 13.3 G/DL — SIGNIFICANT CHANGE UP (ref 13–17)
MAGNESIUM SERPL-MCNC: 1.9 MG/DL — SIGNIFICANT CHANGE UP (ref 1.6–2.6)
MCHC RBC-ENTMCNC: 29.6 PG — SIGNIFICANT CHANGE UP (ref 27–34)
MCHC RBC-ENTMCNC: 33.8 GM/DL — SIGNIFICANT CHANGE UP (ref 32–36)
MCV RBC AUTO: 87.5 FL — SIGNIFICANT CHANGE UP (ref 80–100)
NRBC # BLD: 0 /100 WBCS — SIGNIFICANT CHANGE UP
NRBC # FLD: 0 K/UL — SIGNIFICANT CHANGE UP
PHOSPHATE SERPL-MCNC: 2.5 MG/DL — SIGNIFICANT CHANGE UP (ref 2.5–4.5)
PLATELET # BLD AUTO: 246 K/UL — SIGNIFICANT CHANGE UP (ref 150–400)
POTASSIUM SERPL-MCNC: 3.9 MMOL/L — SIGNIFICANT CHANGE UP (ref 3.5–5.3)
POTASSIUM SERPL-SCNC: 3.9 MMOL/L — SIGNIFICANT CHANGE UP (ref 3.5–5.3)
RBC # BLD: 4.49 M/UL — SIGNIFICANT CHANGE UP (ref 4.2–5.8)
RBC # FLD: 12.6 % — SIGNIFICANT CHANGE UP (ref 10.3–14.5)
SODIUM SERPL-SCNC: 139 MMOL/L — SIGNIFICANT CHANGE UP (ref 135–145)
WBC # BLD: 13.63 K/UL — HIGH (ref 3.8–10.5)
WBC # FLD AUTO: 13.63 K/UL — HIGH (ref 3.8–10.5)

## 2021-03-12 PROCEDURE — 99232 SBSQ HOSP IP/OBS MODERATE 35: CPT

## 2021-03-12 RX ORDER — POTASSIUM PHOSPHATE, MONOBASIC POTASSIUM PHOSPHATE, DIBASIC 236; 224 MG/ML; MG/ML
15 INJECTION, SOLUTION INTRAVENOUS ONCE
Refills: 0 | Status: COMPLETED | OUTPATIENT
Start: 2021-03-12 | End: 2021-03-12

## 2021-03-12 RX ORDER — SODIUM CHLORIDE 9 MG/ML
1000 INJECTION, SOLUTION INTRAVENOUS
Refills: 0 | Status: DISCONTINUED | OUTPATIENT
Start: 2021-03-12 | End: 2021-03-12

## 2021-03-12 RX ORDER — OXYCODONE HYDROCHLORIDE 5 MG/1
5 TABLET ORAL EVERY 4 HOURS
Refills: 0 | Status: DISCONTINUED | OUTPATIENT
Start: 2021-03-12 | End: 2021-03-14

## 2021-03-12 RX ORDER — ACETAMINOPHEN 500 MG
650 TABLET ORAL EVERY 6 HOURS
Refills: 0 | Status: DISCONTINUED | OUTPATIENT
Start: 2021-03-12 | End: 2021-03-14

## 2021-03-12 RX ORDER — OXYCODONE HYDROCHLORIDE 5 MG/1
5 TABLET ORAL EVERY 4 HOURS
Refills: 0 | Status: DISCONTINUED | OUTPATIENT
Start: 2021-03-12 | End: 2021-03-12

## 2021-03-12 RX ORDER — OXYCODONE HYDROCHLORIDE 5 MG/1
10 TABLET ORAL EVERY 4 HOURS
Refills: 0 | Status: DISCONTINUED | OUTPATIENT
Start: 2021-03-12 | End: 2021-03-14

## 2021-03-12 RX ORDER — ENOXAPARIN SODIUM 100 MG/ML
40 INJECTION SUBCUTANEOUS DAILY
Refills: 0 | Status: DISCONTINUED | OUTPATIENT
Start: 2021-03-12 | End: 2021-03-14

## 2021-03-12 RX ORDER — AMLODIPINE BESYLATE 2.5 MG/1
5 TABLET ORAL DAILY
Refills: 0 | Status: DISCONTINUED | OUTPATIENT
Start: 2021-03-12 | End: 2021-03-14

## 2021-03-12 RX ORDER — LOSARTAN POTASSIUM 100 MG/1
100 TABLET, FILM COATED ORAL DAILY
Refills: 0 | Status: DISCONTINUED | OUTPATIENT
Start: 2021-03-12 | End: 2021-03-14

## 2021-03-12 RX ORDER — ATORVASTATIN CALCIUM 80 MG/1
20 TABLET, FILM COATED ORAL AT BEDTIME
Refills: 0 | Status: DISCONTINUED | OUTPATIENT
Start: 2021-03-12 | End: 2021-03-14

## 2021-03-12 RX ORDER — ONDANSETRON 8 MG/1
4 TABLET, FILM COATED ORAL ONCE
Refills: 0 | Status: COMPLETED | OUTPATIENT
Start: 2021-03-12 | End: 2021-03-12

## 2021-03-12 RX ADMIN — Medication 650 MILLIGRAM(S): at 17:45

## 2021-03-12 RX ADMIN — ATORVASTATIN CALCIUM 20 MILLIGRAM(S): 80 TABLET, FILM COATED ORAL at 23:26

## 2021-03-12 RX ADMIN — Medication 400 MILLIGRAM(S): at 07:51

## 2021-03-12 RX ADMIN — HYDROMORPHONE HYDROCHLORIDE 1 MILLIGRAM(S): 2 INJECTION INTRAMUSCULAR; INTRAVENOUS; SUBCUTANEOUS at 10:56

## 2021-03-12 RX ADMIN — HYDROMORPHONE HYDROCHLORIDE 1 MILLIGRAM(S): 2 INJECTION INTRAMUSCULAR; INTRAVENOUS; SUBCUTANEOUS at 07:51

## 2021-03-12 RX ADMIN — HYDROMORPHONE HYDROCHLORIDE 1 MILLIGRAM(S): 2 INJECTION INTRAMUSCULAR; INTRAVENOUS; SUBCUTANEOUS at 11:09

## 2021-03-12 RX ADMIN — OXYCODONE HYDROCHLORIDE 10 MILLIGRAM(S): 5 TABLET ORAL at 23:26

## 2021-03-12 RX ADMIN — HYDROMORPHONE HYDROCHLORIDE 0.5 MILLIGRAM(S): 2 INJECTION INTRAMUSCULAR; INTRAVENOUS; SUBCUTANEOUS at 09:11

## 2021-03-12 RX ADMIN — HYDROMORPHONE HYDROCHLORIDE 1 MILLIGRAM(S): 2 INJECTION INTRAMUSCULAR; INTRAVENOUS; SUBCUTANEOUS at 08:20

## 2021-03-12 RX ADMIN — HYDROMORPHONE HYDROCHLORIDE 0.5 MILLIGRAM(S): 2 INJECTION INTRAMUSCULAR; INTRAVENOUS; SUBCUTANEOUS at 00:29

## 2021-03-12 RX ADMIN — HYDROMORPHONE HYDROCHLORIDE 0.5 MILLIGRAM(S): 2 INJECTION INTRAMUSCULAR; INTRAVENOUS; SUBCUTANEOUS at 03:35

## 2021-03-12 RX ADMIN — HYDROMORPHONE HYDROCHLORIDE 0.5 MILLIGRAM(S): 2 INJECTION INTRAMUSCULAR; INTRAVENOUS; SUBCUTANEOUS at 06:08

## 2021-03-12 RX ADMIN — OXYCODONE HYDROCHLORIDE 5 MILLIGRAM(S): 5 TABLET ORAL at 12:36

## 2021-03-12 RX ADMIN — OXYCODONE HYDROCHLORIDE 5 MILLIGRAM(S): 5 TABLET ORAL at 18:05

## 2021-03-12 RX ADMIN — Medication 650 MILLIGRAM(S): at 11:41

## 2021-03-12 RX ADMIN — OXYCODONE HYDROCHLORIDE 10 MILLIGRAM(S): 5 TABLET ORAL at 22:05

## 2021-03-12 RX ADMIN — Medication 1000 MILLIGRAM(S): at 08:20

## 2021-03-12 RX ADMIN — AMPICILLIN SODIUM AND SULBACTAM SODIUM 200 GRAM(S): 250; 125 INJECTION, POWDER, FOR SUSPENSION INTRAMUSCULAR; INTRAVENOUS at 11:41

## 2021-03-12 RX ADMIN — Medication 650 MILLIGRAM(S): at 12:36

## 2021-03-12 RX ADMIN — POTASSIUM PHOSPHATE, MONOBASIC POTASSIUM PHOSPHATE, DIBASIC 62.5 MILLIMOLE(S): 236; 224 INJECTION, SOLUTION INTRAVENOUS at 10:56

## 2021-03-12 RX ADMIN — AMPICILLIN SODIUM AND SULBACTAM SODIUM 200 GRAM(S): 250; 125 INJECTION, POWDER, FOR SUSPENSION INTRAMUSCULAR; INTRAVENOUS at 17:46

## 2021-03-12 RX ADMIN — AMPICILLIN SODIUM AND SULBACTAM SODIUM 200 GRAM(S): 250; 125 INJECTION, POWDER, FOR SUSPENSION INTRAMUSCULAR; INTRAVENOUS at 23:26

## 2021-03-12 RX ADMIN — HYDROMORPHONE HYDROCHLORIDE 0.5 MILLIGRAM(S): 2 INJECTION INTRAMUSCULAR; INTRAVENOUS; SUBCUTANEOUS at 04:05

## 2021-03-12 RX ADMIN — OXYCODONE HYDROCHLORIDE 5 MILLIGRAM(S): 5 TABLET ORAL at 11:41

## 2021-03-12 RX ADMIN — OXYCODONE HYDROCHLORIDE 5 MILLIGRAM(S): 5 TABLET ORAL at 16:35

## 2021-03-12 RX ADMIN — HYDROMORPHONE HYDROCHLORIDE 0.5 MILLIGRAM(S): 2 INJECTION INTRAMUSCULAR; INTRAVENOUS; SUBCUTANEOUS at 01:15

## 2021-03-12 RX ADMIN — OXYCODONE HYDROCHLORIDE 5 MILLIGRAM(S): 5 TABLET ORAL at 15:20

## 2021-03-12 RX ADMIN — ONDANSETRON 4 MILLIGRAM(S): 8 TABLET, FILM COATED ORAL at 11:40

## 2021-03-12 RX ADMIN — Medication 650 MILLIGRAM(S): at 23:34

## 2021-03-12 RX ADMIN — OXYCODONE HYDROCHLORIDE 10 MILLIGRAM(S): 5 TABLET ORAL at 18:05

## 2021-03-12 RX ADMIN — OXYCODONE HYDROCHLORIDE 10 MILLIGRAM(S): 5 TABLET ORAL at 19:27

## 2021-03-12 RX ADMIN — SODIUM CHLORIDE 75 MILLILITER(S): 9 INJECTION, SOLUTION INTRAVENOUS at 11:35

## 2021-03-12 RX ADMIN — AMPICILLIN SODIUM AND SULBACTAM SODIUM 200 GRAM(S): 250; 125 INJECTION, POWDER, FOR SUSPENSION INTRAMUSCULAR; INTRAVENOUS at 01:09

## 2021-03-12 RX ADMIN — HYDROMORPHONE HYDROCHLORIDE 0.5 MILLIGRAM(S): 2 INJECTION INTRAMUSCULAR; INTRAVENOUS; SUBCUTANEOUS at 09:35

## 2021-03-12 RX ADMIN — ENOXAPARIN SODIUM 40 MILLIGRAM(S): 100 INJECTION SUBCUTANEOUS at 11:41

## 2021-03-12 RX ADMIN — HYDROMORPHONE HYDROCHLORIDE 0.5 MILLIGRAM(S): 2 INJECTION INTRAMUSCULAR; INTRAVENOUS; SUBCUTANEOUS at 07:01

## 2021-03-12 RX ADMIN — Medication 650 MILLIGRAM(S): at 17:47

## 2021-03-12 RX ADMIN — OXYCODONE HYDROCHLORIDE 5 MILLIGRAM(S): 5 TABLET ORAL at 19:27

## 2021-03-12 RX ADMIN — AMPICILLIN SODIUM AND SULBACTAM SODIUM 200 GRAM(S): 250; 125 INJECTION, POWDER, FOR SUSPENSION INTRAMUSCULAR; INTRAVENOUS at 06:06

## 2021-03-12 NOTE — PROGRESS NOTE ADULT - ASSESSMENT
55M hx of HTN, HLD, mitral regurgitation, and tongue cancer now s/p elective R partial glossectomy and R modified lymph node dissection. SICU consulted for airway watch.    PLAN:  NEUROLOGIC:  - Pain control: IV Tylenol PRN, Dilaudid PRN    RESPIRATORY:  - Satting 100% @ RA    CARDIOVASCULAR:  - Monitor hemodynamics    GASTROINTESTINAL:  - NPO    /RENAL:  - LR @ 75  - Monitor I&O    HEMATOLOGIC:  - SCDs for VTE ppx  - ASA 81 for flap    INFECTIOUS DISEASE:  - Perioperative Unasyn    ENDOCRINE:  - F/u glucose levels on BMP    DISPO:  - SICU   55M hx of HTN, HLD, mitral regurgitation, and tongue cancer now s/p elective R partial glossectomy and R modified lymph node dissection. SICU consulted for airway watch.    PLAN:  NEUROLOGIC:  - Pain control: IV Tylenol PRN, Dilaudid PRN    RESPIRATORY: critical airway  - Satting 100% @ RA    CARDIOVASCULAR:  - Monitor hemodynamics  - Resume home medications: Losartan, Amlodipine and Atorvastatin     GASTROINTESTINAL:  - CLD    /RENAL:  - Monitor I&O    HEMATOLOGIC:  - SCDs for VTE ppx  - ASA 81 for flap    INFECTIOUS DISEASE:  - Perioperative Unasyn    ENDOCRINE:  - F/u glucose levels on BMP    DISPO:  - SICU

## 2021-03-12 NOTE — PROGRESS NOTE ADULT - ATTENDING COMMENTS
Patient s/p glossectomy admitted for critical airway. Overnight without issues. Plan for discharge to floor    I have personally interviewed when possible and examined the patient, reviewed data and laboratory tests/x-rays and all pertinent electronic images.  I was physically present for the key portions of the evaluation and management (E/M) service provided.   The SICU team has a constant risk benefit analyzes discussion with the primary team, all consultants, House Staff and PA's on all decisions.  These diagnoses are unrelated to the surgical procedure noted above.  I meet with family if needed to get further history, discuss the case and make care decisions for this patient who might not be able to participate.  Time involved in performance of separately billable procedures was not counted toward my critical care time. There is no overlap.  I spent 30 minutes ( 0800Hrs-0915Hrs in AM/ 1600Hrs-1715Hrs in PM, or other time indicated) of critical care time for the diagnoses, assessment, plan and interventions.  This time excludes time spent on separate procedures and teaching.

## 2021-03-12 NOTE — PROGRESS NOTE ADULT - SUBJECTIVE AND OBJECTIVE BOX
HPI: 55M hx of HTN, HLD, mitral regurgitation, and tongue cancer now s/p elective R partial glossectomy and R modified lymph node dissection. SICU consulted for airway watch. Patient examined on morning rounds, is tolerating secretions, speaking without issue.      ICU Vital Signs Last 24 Hrs  T(C): 36.8 (12 Mar 2021 08:00), Max: 36.9 (12 Mar 2021 00:10)  T(F): 98.2 (12 Mar 2021 08:00), Max: 98.5 (12 Mar 2021 04:00)  HR: 71 (12 Mar 2021 08:00) (71 - 106)  BP: 110/68 (12 Mar 2021 08:00) (110/68 - 167/81)  BP(mean): 79 (12 Mar 2021 08:00) (76 - 105)  ABP: --  ABP(mean): --  RR: 13 (12 Mar 2021 08:00) (9 - 23)  SpO2: 95% (12 Mar 2021 08:00) (94% - 100%)      11 Mar 2021 07:01  -  12 Mar 2021 07:00  --------------------------------------------------------  IN:    IV PiggyBack: 150 mL    Lactated Ringers: 525 mL    Lactated Ringers: 675 mL  Total IN: 1350 mL    OUT:    Bulb (mL): 80 mL    Voided (mL): 575 mL  Total OUT: 655 mL  Total NET: 695 mL    I&O's Summary    11 Mar 2021 07:01  -  12 Mar 2021 07:00  --------------------------------------------------------  IN: 1350 mL / OUT: 655 mL / NET: 695 mL    LABS:                           13.3   13.63 )-----------( 246      ( 12 Mar 2021 04:09 )             39.3     03-12    139  |  103  |  13  ----------------------------<  149<H>  3.9   |  26  |  1.02    Ca    8.7      12 Mar 2021 04:09  Phos  2.5     03-12  Mg     1.9     03-12        PE  Exam: Normal, NAD, alert, oriented x3, no focal deficits.  HEENT  Exam: Normocephalic, atraumatic, EOMI.   NECK:  - s/p right partial modified neck dissection, drain in place with SS fluid output  RESPIRATORY  Exam: Normal expansion/effort.  IOE: tongue incisions hemostatic, no sublingual edema

## 2021-03-12 NOTE — PROGRESS NOTE ADULT - SUBJECTIVE AND OBJECTIVE BOX
SICU Daily Progress Note  =====================================================  Interval/Overnight Events:    - CBI starter per Urology 2/2 persistent hematuria s/p traumatic rsoas in OR     HPI: 55M hx of HTN, HLD, mitral regurgitation, and tongue cancer now s/p elective R partial glossectomy and R modified lymph node dissection. SICU consulted for airway watch.    Allergies: No Known Allergies    MEDICATIONS:   --------------------------------------------------------------------------------------  Neurologic Medications  acetaminophen  IVPB .. 1000 milliGRAM(s) IV Intermittent every 6 hours PRN Mild Pain (1 - 3)  HYDROmorphone  Injectable 0.5 milliGRAM(s) IV Push every 3 hours PRN Moderate Pain (4 - 6)  HYDROmorphone  Injectable 1 milliGRAM(s) IV Push every 3 hours PRN Severe Pain (7 - 10)    Respiratory Medications    Cardiovascular Medications    Gastrointestinal Medications    Genitourinary Medications    Hematologic/Oncologic Medications    Antimicrobial/Immunologic Medications  ampicillin/sulbactam  IVPB 3 Gram(s) IV Intermittent every 6 hours  ampicillin/sulbactam  IVPB        Endocrine/Metabolic Medications    Topical/Other Medications    --------------------------------------------------------------------------------------    VITAL SIGNS, INS/OUTS (last 24 hours):  --------------------------------------------------------------------------------------  T(C): 36.9 (03-12-21 @ 00:10), Max: 36.9 (03-12-21 @ 00:10)  HR: 101 (03-12-21 @ 00:10) (70 - 106)  BP: 167/81 (03-12-21 @ 00:10) (111/65 - 167/81)  RR: 18 (03-12-21 @ 00:10) (9 - 23)  SpO2: 98% (03-12-21 @ 00:10) (94% - 100%)    03-11-21 @ 07:01  -  03-12-21 @ 00:32  --------------------------------------------------------  IN: 650 mL / OUT: 40 mL / NET: 610 mL      --------------------------------------------------------------------------------------    EXAM  NEUROLOGY  Exam: Normal, NAD, alert, oriented x3, no focal deficits.    HEENT  Exam: Normocephalic, atraumatic, EOMI.     NECK:  - s/p right partial modified neck dissection, drain in place with SS fluid output    RESPIRATORY  Exam: Normal expansion/effort.    CARDIOVASCULAR  Exam: Regular rate and rhythm.       GI/NUTRITION  Exam: Abdomen soft, Non-tender, Non-distended.     VASCULAR  Exam: Extremities warm, pink, well-perfused.     MUSCULOSKELETAL  Exam: All extremities moving spontaneously without limitations.     SKIN  Exam: Good skin turgor, no skin breakdown.  SICU Daily Progress Note  =====================================================  Interval/Overnight Events:    - No acute events overnight    HPI: 55M hx of HTN, HLD, mitral regurgitation, and tongue cancer now s/p elective R partial glossectomy and R modified lymph node dissection. SICU consulted for airway watch.    Allergies: No Known Allergies    MEDICATIONS:   --------------------------------------------------------------------------------------  Neurologic Medications  acetaminophen  IVPB .. 1000 milliGRAM(s) IV Intermittent every 6 hours PRN Mild Pain (1 - 3)  HYDROmorphone  Injectable 0.5 milliGRAM(s) IV Push every 3 hours PRN Moderate Pain (4 - 6)  HYDROmorphone  Injectable 1 milliGRAM(s) IV Push every 3 hours PRN Severe Pain (7 - 10)    Respiratory Medications    Cardiovascular Medications    Gastrointestinal Medications    Genitourinary Medications    Hematologic/Oncologic Medications    Antimicrobial/Immunologic Medications  ampicillin/sulbactam  IVPB 3 Gram(s) IV Intermittent every 6 hours  ampicillin/sulbactam  IVPB        Endocrine/Metabolic Medications    Topical/Other Medications    --------------------------------------------------------------------------------------    VITAL SIGNS, INS/OUTS (last 24 hours):  --------------------------------------------------------------------------------------  T(C): 36.9 (03-12-21 @ 00:10), Max: 36.9 (03-12-21 @ 00:10)  HR: 101 (03-12-21 @ 00:10) (70 - 106)  BP: 167/81 (03-12-21 @ 00:10) (111/65 - 167/81)  RR: 18 (03-12-21 @ 00:10) (9 - 23)  SpO2: 98% (03-12-21 @ 00:10) (94% - 100%)    03-11-21 @ 07:01  -  03-12-21 @ 00:32  --------------------------------------------------------  IN: 650 mL / OUT: 40 mL / NET: 610 mL      --------------------------------------------------------------------------------------    EXAM  NEUROLOGY  Exam: Normal, NAD, alert, oriented x3, no focal deficits.    HEENT  Exam: Normocephalic, atraumatic, EOMI.     NECK:  - s/p right partial modified neck dissection, drain in place with SS fluid output    RESPIRATORY  Exam: Normal expansion/effort.    CARDIOVASCULAR  Exam: Regular rate and rhythm.       GI/NUTRITION  Exam: Abdomen soft, Non-tender, Non-distended.     VASCULAR  Exam: Extremities warm, pink, well-perfused.     MUSCULOSKELETAL  Exam: All extremities moving spontaneously without limitations.     SKIN  Exam: Good skin turgor, no skin breakdown.

## 2021-03-12 NOTE — PROGRESS NOTE ADULT - ASSESSMENT
5M hx of HTN, HLD, mitral regurgitation, and tongue cancer now s/p elective R partial glossectomy and R modified lymph node dissection. SICU consulted for airway watch. Patient clear to be downgrded    PLAN:  NEUROLOGIC:  - Pain control: IV Tylenol PRN, Dilaudid PRN  RESPIRATORY:  - Satting 100% @ RA  CARDIOVASCULAR:  - Monitor hemodynamics  GASTROINTESTINAL:  - progress to CLD  /RENAL:  - LR @ 75  - Monitor I&O  HEMATOLOGIC:  - SCDs for VTE ppx  - ASA 81 for flap  INFECTIOUS DISEASE:  - continue Unasyn  ENDOCRINE:  - F/u glucose levels on BMP  DISPO:  - transfer to floor

## 2021-03-13 LAB
ANION GAP SERPL CALC-SCNC: 7 MMOL/L — SIGNIFICANT CHANGE UP (ref 7–14)
BUN SERPL-MCNC: 15 MG/DL — SIGNIFICANT CHANGE UP (ref 7–23)
CALCIUM SERPL-MCNC: 8.2 MG/DL — LOW (ref 8.4–10.5)
CHLORIDE SERPL-SCNC: 104 MMOL/L — SIGNIFICANT CHANGE UP (ref 98–107)
CO2 SERPL-SCNC: 28 MMOL/L — SIGNIFICANT CHANGE UP (ref 22–31)
CREAT SERPL-MCNC: 0.97 MG/DL — SIGNIFICANT CHANGE UP (ref 0.5–1.3)
GLUCOSE SERPL-MCNC: 93 MG/DL — SIGNIFICANT CHANGE UP (ref 70–99)
HCT VFR BLD CALC: 32.7 % — LOW (ref 39–50)
HGB BLD-MCNC: 11 G/DL — LOW (ref 13–17)
MAGNESIUM SERPL-MCNC: 2.1 MG/DL — SIGNIFICANT CHANGE UP (ref 1.6–2.6)
MCHC RBC-ENTMCNC: 29.4 PG — SIGNIFICANT CHANGE UP (ref 27–34)
MCHC RBC-ENTMCNC: 33.6 GM/DL — SIGNIFICANT CHANGE UP (ref 32–36)
MCV RBC AUTO: 87.4 FL — SIGNIFICANT CHANGE UP (ref 80–100)
NRBC # BLD: 0 /100 WBCS — SIGNIFICANT CHANGE UP
NRBC # FLD: 0 K/UL — SIGNIFICANT CHANGE UP
PHOSPHATE SERPL-MCNC: 2.5 MG/DL — SIGNIFICANT CHANGE UP (ref 2.5–4.5)
PLATELET # BLD AUTO: 199 K/UL — SIGNIFICANT CHANGE UP (ref 150–400)
POTASSIUM SERPL-MCNC: 3.6 MMOL/L — SIGNIFICANT CHANGE UP (ref 3.5–5.3)
POTASSIUM SERPL-SCNC: 3.6 MMOL/L — SIGNIFICANT CHANGE UP (ref 3.5–5.3)
RBC # BLD: 3.74 M/UL — LOW (ref 4.2–5.8)
RBC # FLD: 13 % — SIGNIFICANT CHANGE UP (ref 10.3–14.5)
SODIUM SERPL-SCNC: 139 MMOL/L — SIGNIFICANT CHANGE UP (ref 135–145)
WBC # BLD: 11.73 K/UL — HIGH (ref 3.8–10.5)
WBC # FLD AUTO: 11.73 K/UL — HIGH (ref 3.8–10.5)

## 2021-03-13 RX ORDER — SODIUM,POTASSIUM PHOSPHATES 278-250MG
1 POWDER IN PACKET (EA) ORAL ONCE
Refills: 0 | Status: COMPLETED | OUTPATIENT
Start: 2021-03-13 | End: 2021-03-13

## 2021-03-13 RX ORDER — POTASSIUM CHLORIDE 20 MEQ
20 PACKET (EA) ORAL DAILY
Refills: 0 | Status: COMPLETED | OUTPATIENT
Start: 2021-03-13 | End: 2021-03-13

## 2021-03-13 RX ORDER — POTASSIUM CHLORIDE 20 MEQ
10 PACKET (EA) ORAL
Refills: 0 | Status: DISCONTINUED | OUTPATIENT
Start: 2021-03-13 | End: 2021-03-13

## 2021-03-13 RX ADMIN — Medication 1 TABLET(S): at 11:10

## 2021-03-13 RX ADMIN — Medication 650 MILLIGRAM(S): at 11:52

## 2021-03-13 RX ADMIN — LOSARTAN POTASSIUM 100 MILLIGRAM(S): 100 TABLET, FILM COATED ORAL at 06:45

## 2021-03-13 RX ADMIN — AMPICILLIN SODIUM AND SULBACTAM SODIUM 200 GRAM(S): 250; 125 INJECTION, POWDER, FOR SUSPENSION INTRAMUSCULAR; INTRAVENOUS at 11:52

## 2021-03-13 RX ADMIN — OXYCODONE HYDROCHLORIDE 5 MILLIGRAM(S): 5 TABLET ORAL at 16:57

## 2021-03-13 RX ADMIN — AMLODIPINE BESYLATE 5 MILLIGRAM(S): 2.5 TABLET ORAL at 06:45

## 2021-03-13 RX ADMIN — OXYCODONE HYDROCHLORIDE 5 MILLIGRAM(S): 5 TABLET ORAL at 16:27

## 2021-03-13 RX ADMIN — ENOXAPARIN SODIUM 40 MILLIGRAM(S): 100 INJECTION SUBCUTANEOUS at 11:52

## 2021-03-13 RX ADMIN — Medication 650 MILLIGRAM(S): at 23:38

## 2021-03-13 RX ADMIN — Medication 650 MILLIGRAM(S): at 06:45

## 2021-03-13 RX ADMIN — Medication 650 MILLIGRAM(S): at 12:22

## 2021-03-13 RX ADMIN — Medication 650 MILLIGRAM(S): at 18:15

## 2021-03-13 RX ADMIN — Medication 650 MILLIGRAM(S): at 01:10

## 2021-03-13 RX ADMIN — Medication 650 MILLIGRAM(S): at 23:08

## 2021-03-13 RX ADMIN — Medication 650 MILLIGRAM(S): at 07:15

## 2021-03-13 RX ADMIN — Medication 650 MILLIGRAM(S): at 17:45

## 2021-03-13 RX ADMIN — AMPICILLIN SODIUM AND SULBACTAM SODIUM 200 GRAM(S): 250; 125 INJECTION, POWDER, FOR SUSPENSION INTRAMUSCULAR; INTRAVENOUS at 17:45

## 2021-03-13 RX ADMIN — ATORVASTATIN CALCIUM 20 MILLIGRAM(S): 80 TABLET, FILM COATED ORAL at 23:08

## 2021-03-13 RX ADMIN — AMPICILLIN SODIUM AND SULBACTAM SODIUM 200 GRAM(S): 250; 125 INJECTION, POWDER, FOR SUSPENSION INTRAMUSCULAR; INTRAVENOUS at 23:08

## 2021-03-13 RX ADMIN — Medication 20 MILLIEQUIVALENT(S): at 11:10

## 2021-03-13 RX ADMIN — AMPICILLIN SODIUM AND SULBACTAM SODIUM 200 GRAM(S): 250; 125 INJECTION, POWDER, FOR SUSPENSION INTRAMUSCULAR; INTRAVENOUS at 06:45

## 2021-03-13 NOTE — PROGRESS NOTE ADULT - ASSESSMENT
55M hx of HOD 3 POD 2 HTN, HLD, mitral regurgitation, and tongue cancer now s/p elective R partial glossectomy and R modified lymph node dissection transffered to floor from SICU, laying in bed comfortably, tolerating PO fluid intake, ambulating, and voiding without issue.    PLAN:  NEUROLOGIC:  - Pain control: IV Tylenol PRN, Dilaudid PRN  RESPIRATORY:  - Satting 100% @ RA  CARDIOVASCULAR:  - Monitor hemodynamics  GASTROINTESTINAL:  - CLD  /RENAL:  - LR @ 75  - Monitor I&O  HEMATOLOGIC:  - SCDs for VTE ppx  - ASA 81 for flap  INFECTIOUS DISEASE:  - continue Unasyn  ENDOCRINE:  - F/u glucose levels on BMP

## 2021-03-13 NOTE — PROGRESS NOTE ADULT - SUBJECTIVE AND OBJECTIVE BOX
PI: 55M HOD 3 POD 2 hx of HTN, HLD, mitral regurgitation, and tongue cancer now s/p elective R partial glossectomy and R modified lymph node dissection. patient transfered from SICU to  866 B. Patient examined on morning rounds, is tolerating secretions, speaking without issue.      ICU Vital Signs Last 24 Hrs  Vital Signs Last 24 Hrs  T(C): 36.7 (13 Mar 2021 10:00), Max: 37 (12 Mar 2021 14:00)  T(F): 98 (13 Mar 2021 10:00), Max: 98.6 (12 Mar 2021 14:00)  HR: 90 (13 Mar 2021 10:00) (71 - 91)  BP: 115/77 (13 Mar 2021 10:00) (108/62 - 142/85)  BP(mean): 87 (13 Mar 2021 00:00) (73 - 93)  RR: 19 (13 Mar 2021 10:00) (10 - 21)  SpO2: 100% (13 Mar 2021 10:00) (94% - 100%)    LABS:                           11.0   11.73 )-----------( 199      ( 13 Mar 2021 08:21 )             32.7     03-13    139  |  104  |  15  ----------------------------<  93  3.6   |  28  |  0.97    Ca    8.2<L>      13 Mar 2021 08:21  Phos  2.5     03-13  Mg     2.1     03-13    12 Mar 2021 07:01  -  13 Mar 2021 07:00  --------------------------------------------------------  IN:    IV PiggyBack: 612.5 mL    Lactated Ringers: 225 mL    Oral Fluid: 990 mL  Total IN: 1827.5 mL  OUT:    Bulb (mL): 125 mL    Voided (mL): 1600 mL  Total OUT: 1725 mL  Total NET: 102.5 mL    I&O's Summary    12 Mar 2021 07:01  -  13 Mar 2021 07:00  --------------------------------------------------------  IN: 1827.5 mL / OUT: 1725 mL / NET: 102.5 mL      PE  Exam: Normal, NAD, alert, oriented x3, no focal deficits.  HEENT  Exam: Normocephalic, atraumatic, EOMI.   NECK:  - s/p right partial modified neck dissection, drain in place with SS fluid output  RESPIRATORY  Exam: Normal expansion/effort.  IOE: tongue incisions hemostatic, no sublingual edema

## 2021-03-14 ENCOUNTER — TRANSCRIPTION ENCOUNTER (OUTPATIENT)
Age: 56
End: 2021-03-14

## 2021-03-14 VITALS
RESPIRATION RATE: 18 BRPM | OXYGEN SATURATION: 100 % | SYSTOLIC BLOOD PRESSURE: 133 MMHG | HEART RATE: 86 BPM | DIASTOLIC BLOOD PRESSURE: 84 MMHG | TEMPERATURE: 98 F

## 2021-03-14 LAB
ANION GAP SERPL CALC-SCNC: 7 MMOL/L — SIGNIFICANT CHANGE UP (ref 7–14)
BUN SERPL-MCNC: 11 MG/DL — SIGNIFICANT CHANGE UP (ref 7–23)
CALCIUM SERPL-MCNC: 8.7 MG/DL — SIGNIFICANT CHANGE UP (ref 8.4–10.5)
CHLORIDE SERPL-SCNC: 102 MMOL/L — SIGNIFICANT CHANGE UP (ref 98–107)
CO2 SERPL-SCNC: 30 MMOL/L — SIGNIFICANT CHANGE UP (ref 22–31)
CREAT SERPL-MCNC: 0.99 MG/DL — SIGNIFICANT CHANGE UP (ref 0.5–1.3)
GLUCOSE SERPL-MCNC: 110 MG/DL — HIGH (ref 70–99)
HCT VFR BLD CALC: 36.9 % — LOW (ref 39–50)
HGB BLD-MCNC: 12.4 G/DL — LOW (ref 13–17)
MAGNESIUM SERPL-MCNC: 2 MG/DL — SIGNIFICANT CHANGE UP (ref 1.6–2.6)
MCHC RBC-ENTMCNC: 30 PG — SIGNIFICANT CHANGE UP (ref 27–34)
MCHC RBC-ENTMCNC: 33.6 GM/DL — SIGNIFICANT CHANGE UP (ref 32–36)
MCV RBC AUTO: 89.3 FL — SIGNIFICANT CHANGE UP (ref 80–100)
NRBC # BLD: 0 /100 WBCS — SIGNIFICANT CHANGE UP
NRBC # FLD: 0 K/UL — SIGNIFICANT CHANGE UP
PHOSPHATE SERPL-MCNC: 3.3 MG/DL — SIGNIFICANT CHANGE UP (ref 2.5–4.5)
PLATELET # BLD AUTO: 228 K/UL — SIGNIFICANT CHANGE UP (ref 150–400)
POTASSIUM SERPL-MCNC: 3.7 MMOL/L — SIGNIFICANT CHANGE UP (ref 3.5–5.3)
POTASSIUM SERPL-SCNC: 3.7 MMOL/L — SIGNIFICANT CHANGE UP (ref 3.5–5.3)
RBC # BLD: 4.13 M/UL — LOW (ref 4.2–5.8)
RBC # FLD: 12.7 % — SIGNIFICANT CHANGE UP (ref 10.3–14.5)
SODIUM SERPL-SCNC: 139 MMOL/L — SIGNIFICANT CHANGE UP (ref 135–145)
WBC # BLD: 8.69 K/UL — SIGNIFICANT CHANGE UP (ref 3.8–10.5)
WBC # FLD AUTO: 8.69 K/UL — SIGNIFICANT CHANGE UP (ref 3.8–10.5)

## 2021-03-14 RX ORDER — OXYCODONE HYDROCHLORIDE 5 MG/1
1 TABLET ORAL
Qty: 24 | Refills: 0
Start: 2021-03-14 | End: 2021-03-17

## 2021-03-14 RX ORDER — AMOXICILLIN 250 MG/5ML
1 SUSPENSION, RECONSTITUTED, ORAL (ML) ORAL
Qty: 21 | Refills: 0
Start: 2021-03-14 | End: 2021-03-20

## 2021-03-14 RX ORDER — CHLORHEXIDINE GLUCONATE 213 G/1000ML
15 SOLUTION TOPICAL
Qty: 420 | Refills: 0
Start: 2021-03-14 | End: 2021-03-27

## 2021-03-14 RX ORDER — ACETAMINOPHEN 500 MG
2 TABLET ORAL
Qty: 56 | Refills: 0
Start: 2021-03-14 | End: 2021-03-20

## 2021-03-14 RX ORDER — CHOLECALCIFEROL (VITAMIN D3) 125 MCG
0 CAPSULE ORAL
Qty: 0 | Refills: 0 | DISCHARGE

## 2021-03-14 RX ADMIN — ENOXAPARIN SODIUM 40 MILLIGRAM(S): 100 INJECTION SUBCUTANEOUS at 13:53

## 2021-03-14 RX ADMIN — AMPICILLIN SODIUM AND SULBACTAM SODIUM 200 GRAM(S): 250; 125 INJECTION, POWDER, FOR SUSPENSION INTRAMUSCULAR; INTRAVENOUS at 13:53

## 2021-03-14 RX ADMIN — Medication 650 MILLIGRAM(S): at 13:52

## 2021-03-14 RX ADMIN — LOSARTAN POTASSIUM 100 MILLIGRAM(S): 100 TABLET, FILM COATED ORAL at 06:58

## 2021-03-14 RX ADMIN — Medication 650 MILLIGRAM(S): at 15:35

## 2021-03-14 RX ADMIN — AMLODIPINE BESYLATE 5 MILLIGRAM(S): 2.5 TABLET ORAL at 06:58

## 2021-03-14 RX ADMIN — AMPICILLIN SODIUM AND SULBACTAM SODIUM 200 GRAM(S): 250; 125 INJECTION, POWDER, FOR SUSPENSION INTRAMUSCULAR; INTRAVENOUS at 06:58

## 2021-03-14 RX ADMIN — Medication 650 MILLIGRAM(S): at 06:58

## 2021-03-14 NOTE — PROGRESS NOTE ADULT - SUBJECTIVE AND OBJECTIVE BOX
PI: 55M HOD 4 POD 3 hx of HTN, HLD, mitral regurgitation, and tongue cancer now s/p elective R partial glossectomy and R modified lymph node dissection. patient transfered from SICU to  866 B. Patient examined on morning rounds, is tolerating secretions, speaking without issue.    Vital Signs Last 24 Hrs  T(C): 36.5 (14 Mar 2021 06:57), Max: 37.1 (13 Mar 2021 18:08)  T(F): 97.7 (14 Mar 2021 06:57), Max: 98.7 (13 Mar 2021 18:08)  HR: 84 (14 Mar 2021 06:57) (80 - 91)  BP: 134/92 (14 Mar 2021 06:57) (114/76 - 134/92)  BP(mean): --  RR: 16 (14 Mar 2021 06:57) (16 - 19)  SpO2: 98% (14 Mar 2021 06:57) (96% - 100%)    MEDICATIONS  (STANDING):  acetaminophen   Tablet .. 650 milliGRAM(s) Oral every 6 hours  amLODIPine   Tablet 5 milliGRAM(s) Oral daily  ampicillin/sulbactam  IVPB 3 Gram(s) IV Intermittent every 6 hours  ampicillin/sulbactam  IVPB      atorvastatin 20 milliGRAM(s) Oral at bedtime  enoxaparin Injectable 40 milliGRAM(s) SubCutaneous daily  losartan 100 milliGRAM(s) Oral daily  MEDICATIONS  (PRN):  oxyCODONE    IR 5 milliGRAM(s) Oral every 4 hours PRN Moderate Pain (4 - 6)  oxyCODONE    IR 10 milliGRAM(s) Oral every 4 hours PRN Severe Pain (7 - 10)  Home Medications:  amLODIPine 5 mg oral tablet: 1 tab(s) orally once a day, AM (11 Mar 2021 07:02)  atorvastatin 20 mg oral tablet: 1 tab(s) orally once a day (at bedtime) (11 Mar 2021 07:02)  losartan 100 mg oral tablet: orally once a day in AM  (11 Mar 2021 07:02)  Vitamin D3 1000 intl units (25 mcg) oral capsule: orally once a day, AM (11 Mar 2021 07:02)      LABS:              11.0   11.73 )-----------( 199      ( 13 Mar 2021 08:21 )             32.7     03-13    139  |  104  |  15  ----------------------------<  93  3.6   |  28  |  0.97    Ca    8.2<L>      13 Mar 2021 08:21  Phos  2.5     03-13  Mg     2.1     03-13          13 Mar 2021 06:01  -  14 Mar 2021 07:00  --------------------------------------------------------  IN:  Total IN: 0 mL    OUT:    Bulb (mL): 40 mL  Total OUT: 40 mL    Total NET: -40 mL    I&O's Summary    13 Mar 2021 06:01  -  14 Mar 2021 07:00  --------------------------------------------------------  IN: 0 mL / OUT: 40 mL / NET: -40 mL    PE  Exam: Normal, NAD, alert, oriented x3, no focal deficits.  HEENT  Exam: Normocephalic, atraumatic, EOMI.   NECK:  - s/p right partial modified neck dissection, drain in place with SS fluid output  RESPIRATORY  Exam: Normal expansion/effort.  IOE: tongue incisions hemostatic, no sublingual edema

## 2021-03-14 NOTE — DISCHARGE NOTE PROVIDER - HOSPITAL COURSE
03/10/2021- 55 year old patient presents for R partial glossectomy and R modified lymph node dissection for tongue oral cancer at [facility] OR under GA with Dr. Soliman. Surgery was performed without any complication. Patient was then extubated and brought to the PAC 4 hrs s/p procedure. Patient reported or nausea. ERMIAS since OR, pain was well controlled.  03/11/2021- 55M hx of HTN, HLD, mitral regurgitation, and tongue cancer now s/p elective R partial glossectomy and R modified lymph node dissection. SICU consulted for airway watch. Patient examined on morning rounds, is tolerating secretions, speaking without issue. Follow-up evaluation was performed in SICU. Patient reports slight pain overnight which has since resolved, no nausea, no dysphagia. Pt became ambulatory, has independently voided, and tolerating PO intake without issue  03/12/2021-55M HOD 4 POD 3 hx of HTN, HLD, mitral regurgitation, and tongue cancer now s/p elective R partial glossectomy and R modified lymph node dissection. patient downgraded from SICU to floor, examined on morning rounds at 8S 866 B, is tolerating secretions, speaking without issue  03/14/2021-55M HOD 4 POD 3 hx of HTN, HLD, mitral regurgitation, and tongue cancer now s/p elective R partial glossectomy and R modified lymph node dissection. patient examined on morning rounds at 8S 866 B, is tolerating secretions, speaking without issue.  03/15/2021 - 55M HOD 4 POD 3 hx of HTN, HLD, mitral regurgitation, and tongue cancer now s/p elective R partial glossectomy and R modified lymph node dissection. patient examined on morning rounds at 8S 866 B, is tolerating secretions, speaking without issue. Patient has had ANANYA drains removed. Patient is cleared for discharge

## 2021-03-14 NOTE — DISCHARGE NOTE PROVIDER - CARE PROVIDER_API CALL
Nelson Chavarria (DDS; MD)  OralMaxillofacial Surgery  [phone] [place] [address]  Phone: [phone]  Fax: [phone]  Follow Up Time:

## 2021-03-14 NOTE — DISCHARGE NOTE NURSING/CASE MANAGEMENT/SOCIAL WORK - PATIENT PORTAL LINK FT
You can access the FollowMyHealth Patient Portal offered by [facility] by registering at the following website: [email or web address]. By joining [facility]’s FollowMyHealth portal, you will also be able to view your health information using other applications (apps) compatible with our system.

## 2021-03-14 NOTE — DISCHARGE NOTE PROVIDER - NSDCFUADDINST_GEN_ALL_CORE_FT
diet: clear liquid  pain: tylenol/ibuprofen; oxycodone if ineffective  please call [facility] clinic [phone] to schedule for 1 week f/u appointment

## 2021-03-14 NOTE — PROGRESS NOTE ADULT - ASSESSMENT
55M hx of HOD 4 POD 3 HTN, HLD, mitral regurgitation, and tongue cancer now s/p elective R partial glossectomy and R modified lymph node dissection transffered to floor from SICU, laying in bed comfortably, tolerating PO fluid intake, ambulating, and voiding without issue.    PLAN:  NEUROLOGIC:  - Pain control: IV Tylenol PRN, Dilaudid PRN  RESPIRATORY:  - Satting 100% @ RA  CARDIOVASCULAR:  - Monitor hemodynamics  GASTROINTESTINAL:  - CLD  /RENAL:  - LR @ 75  - Monitor I&O  HEMATOLOGIC:  - SCDs for VTE ppx  - ASA 81 for flap  INFECTIOUS DISEASE:  - continue Unasyn  - f/u CBC  ENDOCRINE:  - F/u glucose levels on BMP

## 2021-03-14 NOTE — DISCHARGE NOTE NURSING/CASE MANAGEMENT/SOCIAL WORK - NSDCPNINST_GEN_ALL_CORE
Do not scrub your incision sites or remove the dressings. They may fall off on their own or will be removed during your follow up visit. Do not use any  powder, lotion or cream on or near incision sites. Watch for signs of infection; redness, swelling, fever, chills or heat, temperature of 100.4 or higher or pain unrelieved by pain medication, report such symptoms to the MD. No driving while taking pain medication, it causes drowsiness & constipation. Drink 6-8 glasses of fluids daily to promote hydration. If unable to tolerate diet and experiencing nausea/vomiting, call your provider. No heavy lifting, pulling or pushing heavy objects. Follow up with the MD.

## 2021-03-14 NOTE — DISCHARGE NOTE PROVIDER - NSDCMRMEDTOKEN_GEN_ALL_CORE_FT
acetaminophen 325 mg oral tablet: 2 tab(s) orally every 6 hours MDD:max 8 tabs a day  amLODIPine 5 mg oral tablet: 1 tab(s) orally once a day, AM  amoxicillin 500 mg oral capsule: 1 cap(s) orally 3 times a day MDD:max 3 tabs a day  atorvastatin 20 mg oral tablet: 1 tab(s) orally once a day (at bedtime)  losartan 100 mg oral tablet: orally once a day in AM   oxyCODONE 10 mg oral tablet: 1 tab(s) orally every 4 hours, As needed, Severe Pain (7 - 10) MDD:max 6 tabs a day  Paroex 0.12% mucous membrane liquid: 15 milliliter(s) orally 2 times a day MDD:max 30mL a day

## 2021-03-19 ENCOUNTER — RX RENEWAL (OUTPATIENT)
Age: 56
End: 2021-03-19

## 2021-03-23 ENCOUNTER — TRANSCRIPTION ENCOUNTER (OUTPATIENT)
Age: 56
End: 2021-03-23

## 2021-03-28 ENCOUNTER — RX RENEWAL (OUTPATIENT)
Age: 56
End: 2021-03-28

## 2021-06-22 ENCOUNTER — APPOINTMENT (OUTPATIENT)
Dept: DISASTER EMERGENCY | Facility: OTHER | Age: 56
End: 2021-06-22
Payer: COMMERCIAL

## 2021-06-22 PROCEDURE — 0001A: CPT

## 2021-07-13 ENCOUNTER — APPOINTMENT (OUTPATIENT)
Dept: DISASTER EMERGENCY | Facility: OTHER | Age: 56
End: 2021-07-13
Payer: COMMERCIAL

## 2021-07-13 PROCEDURE — 0002A: CPT

## 2021-08-30 ENCOUNTER — APPOINTMENT (OUTPATIENT)
Dept: INTERNAL MEDICINE | Facility: CLINIC | Age: 56
End: 2021-08-30
Payer: COMMERCIAL

## 2021-08-30 ENCOUNTER — NON-APPOINTMENT (OUTPATIENT)
Age: 56
End: 2021-08-30

## 2021-08-30 VITALS
DIASTOLIC BLOOD PRESSURE: 80 MMHG | WEIGHT: 175 LBS | BODY MASS INDEX: 26.52 KG/M2 | SYSTOLIC BLOOD PRESSURE: 120 MMHG | HEIGHT: 68 IN

## 2021-08-30 DIAGNOSIS — R55 SYNCOPE AND COLLAPSE: ICD-10-CM

## 2021-08-30 DIAGNOSIS — Z86.19 PERSONAL HISTORY OF OTHER INFECTIOUS AND PARASITIC DISEASES: ICD-10-CM

## 2021-08-30 DIAGNOSIS — Z78.9 OTHER SPECIFIED HEALTH STATUS: ICD-10-CM

## 2021-08-30 DIAGNOSIS — Z85.810 PERSONAL HISTORY OF MALIGNANT NEOPLASM OF TONGUE: ICD-10-CM

## 2021-08-30 DIAGNOSIS — Z23 ENCOUNTER FOR IMMUNIZATION: ICD-10-CM

## 2021-08-30 DIAGNOSIS — R94.39 ABNORMAL RESULT OF OTHER CARDIOVASCULAR FUNCTION STUDY: ICD-10-CM

## 2021-08-30 PROCEDURE — 90715 TDAP VACCINE 7 YRS/> IM: CPT

## 2021-08-30 PROCEDURE — 99396 PREV VISIT EST AGE 40-64: CPT | Mod: 25

## 2021-08-30 PROCEDURE — 90471 IMMUNIZATION ADMIN: CPT

## 2021-08-30 PROCEDURE — 93000 ELECTROCARDIOGRAM COMPLETE: CPT

## 2021-08-31 ENCOUNTER — APPOINTMENT (OUTPATIENT)
Dept: INTERNAL MEDICINE | Facility: CLINIC | Age: 56
End: 2021-08-31
Payer: COMMERCIAL

## 2021-08-31 LAB
APPEARANCE: CLEAR
BILIRUBIN URINE: NEGATIVE
BLOOD URINE: NEGATIVE
COLOR: NORMAL
GLUCOSE QUALITATIVE U: NEGATIVE
KETONES URINE: NEGATIVE
LEUKOCYTE ESTERASE URINE: NEGATIVE
NITRITE URINE: NEGATIVE
PH URINE: 6.5
PROTEIN URINE: NEGATIVE
SPECIFIC GRAVITY URINE: 1.01
UROBILINOGEN URINE: NORMAL

## 2021-08-31 PROCEDURE — 36415 COLL VENOUS BLD VENIPUNCTURE: CPT

## 2021-09-01 LAB
25(OH)D3 SERPL-MCNC: 31 NG/ML
BASOPHILS # BLD AUTO: 0.05 K/UL
BASOPHILS NFR BLD AUTO: 0.7 %
CHOLEST SERPL-MCNC: 140 MG/DL
EOSINOPHIL # BLD AUTO: 0.27 K/UL
EOSINOPHIL NFR BLD AUTO: 3.8 %
HCT VFR BLD CALC: 42.2 %
HDLC SERPL-MCNC: 52 MG/DL
HGB BLD-MCNC: 14.3 G/DL
HIV1+2 AB SPEC QL IA.RAPID: NONREACTIVE
IMM GRANULOCYTES NFR BLD AUTO: 0.1 %
LDLC SERPL CALC-MCNC: 63 MG/DL
LYMPHOCYTES # BLD AUTO: 2.69 K/UL
LYMPHOCYTES NFR BLD AUTO: 37.6 %
MAN DIFF?: NORMAL
MCHC RBC-ENTMCNC: 30.2 PG
MCHC RBC-ENTMCNC: 33.9 GM/DL
MCV RBC AUTO: 89.2 FL
MONOCYTES # BLD AUTO: 0.42 K/UL
MONOCYTES NFR BLD AUTO: 5.9 %
NEUTROPHILS # BLD AUTO: 3.72 K/UL
NEUTROPHILS NFR BLD AUTO: 51.9 %
NONHDLC SERPL-MCNC: 87 MG/DL
PLATELET # BLD AUTO: 246 K/UL
PSA SERPL-MCNC: 0.5 NG/ML
RBC # BLD: 4.73 M/UL
RBC # FLD: 13.1 %
TRIGL SERPL-MCNC: 121 MG/DL
TSH SERPL-ACNC: 2.49 UIU/ML
WBC # FLD AUTO: 7.16 K/UL

## 2021-09-10 ENCOUNTER — NON-APPOINTMENT (OUTPATIENT)
Age: 56
End: 2021-09-10

## 2021-09-10 LAB
ALBUMIN SERPL ELPH-MCNC: 4.6 G/DL
ALP BLD-CCNC: 70 U/L
ALT SERPL-CCNC: 25 U/L
ANION GAP SERPL CALC-SCNC: 12 MMOL/L
AST SERPL-CCNC: 16 U/L
BILIRUB SERPL-MCNC: 0.6 MG/DL
BUN SERPL-MCNC: 12 MG/DL
CALCIUM SERPL-MCNC: 9.5 MG/DL
CHLORIDE SERPL-SCNC: 104 MMOL/L
CO2 SERPL-SCNC: 25 MMOL/L
CREAT SERPL-MCNC: 1.1 MG/DL
ESTIMATED AVERAGE GLUCOSE: 123 MG/DL
GLUCOSE SERPL-MCNC: 101 MG/DL
HBA1C MFR BLD HPLC: 5.9 %
POTASSIUM SERPL-SCNC: 4.5 MMOL/L
PROT SERPL-MCNC: 7.2 G/DL
SODIUM SERPL-SCNC: 141 MMOL/L

## 2021-11-12 ENCOUNTER — APPOINTMENT (OUTPATIENT)
Dept: CARDIOLOGY | Facility: CLINIC | Age: 56
End: 2021-11-12

## 2021-11-19 ENCOUNTER — RX RENEWAL (OUTPATIENT)
Age: 56
End: 2021-11-19

## 2021-11-24 ENCOUNTER — RX RENEWAL (OUTPATIENT)
Age: 56
End: 2021-11-24

## 2022-01-26 ENCOUNTER — RX RENEWAL (OUTPATIENT)
Age: 57
End: 2022-01-26

## 2022-02-12 ENCOUNTER — RX RENEWAL (OUTPATIENT)
Age: 57
End: 2022-02-12

## 2022-03-05 ENCOUNTER — RX RENEWAL (OUTPATIENT)
Age: 57
End: 2022-03-05

## 2022-03-24 ENCOUNTER — APPOINTMENT (OUTPATIENT)
Dept: CARDIOLOGY | Facility: CLINIC | Age: 57
End: 2022-03-24
Payer: COMMERCIAL

## 2022-03-24 ENCOUNTER — NON-APPOINTMENT (OUTPATIENT)
Age: 57
End: 2022-03-24

## 2022-03-24 VITALS — SYSTOLIC BLOOD PRESSURE: 136 MMHG | DIASTOLIC BLOOD PRESSURE: 93 MMHG

## 2022-03-24 VITALS
WEIGHT: 176 LBS | HEART RATE: 71 BPM | SYSTOLIC BLOOD PRESSURE: 130 MMHG | OXYGEN SATURATION: 100 % | BODY MASS INDEX: 26.76 KG/M2 | DIASTOLIC BLOOD PRESSURE: 80 MMHG

## 2022-03-24 DIAGNOSIS — R94.31 ABNORMAL ELECTROCARDIOGRAM [ECG] [EKG]: ICD-10-CM

## 2022-03-24 DIAGNOSIS — R55 SYNCOPE AND COLLAPSE: ICD-10-CM

## 2022-03-24 PROCEDURE — 93000 ELECTROCARDIOGRAM COMPLETE: CPT

## 2022-03-24 PROCEDURE — 93306 TTE W/DOPPLER COMPLETE: CPT

## 2022-03-24 PROCEDURE — 93040 RHYTHM ECG WITH REPORT: CPT | Mod: 59

## 2022-03-24 PROCEDURE — 99214 OFFICE O/P EST MOD 30 MIN: CPT

## 2022-03-24 NOTE — HISTORY OF PRESENT ILLNESS
[FreeTextEntry1] : 3/9/2021 - Office visit:\par He was admitted to [facility] in September 2019 for syncope following initiation of a new medication; he has been unable to determine the identity of the medication.\par He stopped taking hydrochlorothiazide since his last visit with me in May 2020. \par He walks 1-2 miles per day without any symptoms, but does not formally exercise.\par Review of systems is negative. Specifically, he denies chest pain, shortness of breath, palpitations, and dizziness.\par Prior cardiac work-up includes the following:\par Echocardiogram (10/26/2017) - LVEF 65-70%, mild MR\par Stress echocardiogram (2/13/2018) - 10'45" seconds Eric protocol (12 METS); 1.8 mm downsloping ST depressions III and 1.2 mm horizontal ST depressions aVF at peak exercise, resolving by 1 minute recovery; no echocardiographic evidence of ischemia.\par \par 03/24/2022 - Office visit:\par He denies chest pain, shortness of breath, palpitations, and dizziness.\par

## 2022-04-13 ENCOUNTER — TRANSCRIPTION ENCOUNTER (OUTPATIENT)
Age: 57
End: 2022-04-13

## 2022-04-13 DIAGNOSIS — J06.9 ACUTE UPPER RESPIRATORY INFECTION, UNSPECIFIED: ICD-10-CM

## 2022-04-29 ENCOUNTER — APPOINTMENT (OUTPATIENT)
Dept: CARDIOLOGY | Facility: CLINIC | Age: 57
End: 2022-04-29
Payer: COMMERCIAL

## 2022-04-29 VITALS
HEART RATE: 76 BPM | SYSTOLIC BLOOD PRESSURE: 136 MMHG | DIASTOLIC BLOOD PRESSURE: 84 MMHG | WEIGHT: 176 LBS | OXYGEN SATURATION: 98 % | BODY MASS INDEX: 26.76 KG/M2

## 2022-04-29 VITALS — SYSTOLIC BLOOD PRESSURE: 144 MMHG | DIASTOLIC BLOOD PRESSURE: 92 MMHG

## 2022-04-29 PROCEDURE — 99214 OFFICE O/P EST MOD 30 MIN: CPT

## 2022-04-29 NOTE — HISTORY OF PRESENT ILLNESS
[FreeTextEntry1] : 3/9/2021 - Office visit:\par He was admitted to [facility] in September 2019 for syncope following initiation of a new medication; he has been unable to determine the identity of the medication.\par He stopped taking hydrochlorothiazide since his last visit with me in May 2020. \par He walks 1-2 miles per day without any symptoms, but does not formally exercise.\par Review of systems is negative. Specifically, he denies chest pain, shortness of breath, palpitations, and dizziness.\par Prior cardiac work-up includes the following:\par Echocardiogram (10/26/2017) - LVEF 65-70%, mild MR\par Stress echocardiogram (2/13/2018) - 10'45" seconds Eric protocol (12 METS); 1.8 mm downsloping ST depressions III and 1.2 mm horizontal ST depressions aVF at peak exercise, resolving by 1 minute recovery; no echocardiographic evidence of ischemia.\par \par 03/24/2022 - Office visit:\par He denies chest pain, shortness of breath, palpitations, and dizziness.\par \par 04/29/2022 - Office visit:\par He is on a higher dose of amlodipine (10 mg daily).\par His blood pressures at home are 130-135/80-85.\par He denies chest pain, shortness of breath, palpitations, and dizziness.\par \par

## 2022-07-12 ENCOUNTER — APPOINTMENT (OUTPATIENT)
Dept: CARDIOLOGY | Facility: CLINIC | Age: 57
End: 2022-07-12

## 2022-07-12 ENCOUNTER — NON-APPOINTMENT (OUTPATIENT)
Age: 57
End: 2022-07-12

## 2022-07-12 VITALS — SYSTOLIC BLOOD PRESSURE: 130 MMHG | DIASTOLIC BLOOD PRESSURE: 90 MMHG

## 2022-07-12 VITALS — OXYGEN SATURATION: 100 % | WEIGHT: 172 LBS | HEART RATE: 71 BPM | BODY MASS INDEX: 26.15 KG/M2

## 2022-07-12 PROCEDURE — 93040 RHYTHM ECG WITH REPORT: CPT | Mod: 59

## 2022-07-12 PROCEDURE — 99214 OFFICE O/P EST MOD 30 MIN: CPT

## 2022-07-12 PROCEDURE — 93000 ELECTROCARDIOGRAM COMPLETE: CPT

## 2022-07-13 NOTE — HISTORY OF PRESENT ILLNESS
[FreeTextEntry1] : 3/9/2021 - Office visit:\par He was admitted to [facility] in September 2019 for syncope following initiation of a new medication; he has been unable to determine the identity of the medication.\par He stopped taking hydrochlorothiazide since his last visit with me in May 2020. \par He walks 1-2 miles per day without any symptoms, but does not formally exercise.\par Review of systems is negative. Specifically, he denies chest pain, shortness of breath, palpitations, and dizziness.\par Prior cardiac work-up includes the following:\par Echocardiogram (10/26/2017) - LVEF 65-70%, mild MR\par Stress echocardiogram (2/13/2018) - 10'45" seconds Eric protocol (12 METS); 1.8 mm downsloping ST depressions III and 1.2 mm horizontal ST depressions aVF at peak exercise, resolving by 1 minute recovery; no echocardiographic evidence of ischemia.\par \par 03/24/2022 - Office visit:\par He denies chest pain, shortness of breath, palpitations, and dizziness.\par \par 04/29/2022 - Office visit:\par He is on a higher dose of amlodipine (10 mg daily).\par His blood pressures at home are 130-135/80-85.\par He denies chest pain, shortness of breath, palpitations, and dizziness.\par \par 07/12/2022 - Office visit:\par He has not checked his blood pressure at home for a long time.\par He denies chest pain, shortness of breath, palpitations, and dizziness.\par \par \par

## 2022-07-20 ENCOUNTER — NON-APPOINTMENT (OUTPATIENT)
Age: 57
End: 2022-07-20

## 2022-09-02 ENCOUNTER — NON-APPOINTMENT (OUTPATIENT)
Age: 57
End: 2022-09-02

## 2022-09-02 ENCOUNTER — APPOINTMENT (OUTPATIENT)
Dept: INTERNAL MEDICINE | Facility: CLINIC | Age: 57
End: 2022-09-02

## 2022-09-02 VITALS
DIASTOLIC BLOOD PRESSURE: 76 MMHG | BODY MASS INDEX: 25.76 KG/M2 | HEIGHT: 68 IN | WEIGHT: 170 LBS | SYSTOLIC BLOOD PRESSURE: 126 MMHG

## 2022-09-02 PROCEDURE — G0444 DEPRESSION SCREEN ANNUAL: CPT | Mod: 59

## 2022-09-02 PROCEDURE — 99396 PREV VISIT EST AGE 40-64: CPT | Mod: 25

## 2022-09-02 PROCEDURE — 93000 ELECTROCARDIOGRAM COMPLETE: CPT | Mod: 59

## 2022-09-02 PROCEDURE — 36415 COLL VENOUS BLD VENIPUNCTURE: CPT

## 2022-09-02 NOTE — PHYSICAL EXAM
[No Acute Distress] : no acute distress [Well Nourished] : well nourished [Normal Affect] : the affect was normal [Normal Insight/Judgement] : insight and judgment were intact [PERRL] : pupils equal round and reactive to light [Normal Oropharynx] : the oropharynx was normal [Normal TMs] : both tympanic membranes were normal [Normal Nasal Mucosa] : the nasal mucosa was normal [No Lymphadenopathy] : no lymphadenopathy [Supple] : supple [Thyroid Normal, No Nodules] : the thyroid was normal and there were no nodules present [No Accessory Muscle Use] : no accessory muscle use [Clear to Auscultation] : lungs were clear to auscultation bilaterally [Regular Rhythm] : with a regular rhythm [Normal S1, S2] : normal S1 and S2 [No Murmur] : no murmur heard [No Edema] : there was no peripheral edema [Soft] : abdomen soft [Non Tender] : non-tender [Non-distended] : non-distended [No Masses] : no abdominal mass palpated [No HSM] : no HSM [Penis Abnormality] : normal circumcised penis [Testes Tenderness] : no tenderness of the testes [Testes Mass (___cm)] : there were no testicular masses [Prostate Size ___ gm] : prostate size [unfilled] gm [Normal Supraclavicular Nodes] : no supraclavicular lymphadenopathy [Normal Posterior Cervical Nodes] : no posterior cervical lymphadenopathy [Normal Anterior Cervical Nodes] : no anterior cervical lymphadenopathy

## 2022-09-02 NOTE — HISTORY OF PRESENT ILLNESS
[FreeTextEntry1] : \par CPE  [de-identified] : Diet: good \par \par Exercise: good\par \par \par in interval - saw Dr. Juárez (cardiologist) last month\par saw oral surgeon for f/u \par UTD with oral surgeon exam \par has scheduled a colonoscopy \par

## 2022-09-02 NOTE — ASSESSMENT
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

## 2022-09-06 LAB
ALBUMIN SERPL ELPH-MCNC: 4.9 G/DL
ALP BLD-CCNC: 63 U/L
ALT SERPL-CCNC: 26 U/L
ANION GAP SERPL CALC-SCNC: 11 MMOL/L
APPEARANCE: CLEAR
AST SERPL-CCNC: 19 U/L
BASOPHILS # BLD AUTO: 0.06 K/UL
BASOPHILS NFR BLD AUTO: 0.9 %
BILIRUB SERPL-MCNC: 0.7 MG/DL
BILIRUBIN URINE: NEGATIVE
BLOOD URINE: NEGATIVE
BUN SERPL-MCNC: 20 MG/DL
CALCIUM SERPL-MCNC: 10.1 MG/DL
CHLORIDE SERPL-SCNC: 104 MMOL/L
CHOLEST SERPL-MCNC: 152 MG/DL
CO2 SERPL-SCNC: 27 MMOL/L
COLOR: NORMAL
CREAT SERPL-MCNC: 1.08 MG/DL
EGFR: 80 ML/MIN/1.73M2
EOSINOPHIL # BLD AUTO: 0.43 K/UL
EOSINOPHIL NFR BLD AUTO: 6.1 %
GLUCOSE QUALITATIVE U: NEGATIVE
GLUCOSE SERPL-MCNC: 97 MG/DL
HCT VFR BLD CALC: 44.5 %
HDLC SERPL-MCNC: 54 MG/DL
HGB BLD-MCNC: 15.1 G/DL
HIV1+2 AB SPEC QL IA.RAPID: NONREACTIVE
IMM GRANULOCYTES NFR BLD AUTO: 0.1 %
KETONES URINE: NEGATIVE
LDLC SERPL CALC-MCNC: 67 MG/DL
LEUKOCYTE ESTERASE URINE: NEGATIVE
LYMPHOCYTES # BLD AUTO: 3.1 K/UL
LYMPHOCYTES NFR BLD AUTO: 44.1 %
MAN DIFF?: NORMAL
MCHC RBC-ENTMCNC: 30 PG
MCHC RBC-ENTMCNC: 33.9 GM/DL
MCV RBC AUTO: 88.5 FL
MONOCYTES # BLD AUTO: 0.42 K/UL
MONOCYTES NFR BLD AUTO: 6 %
NEUTROPHILS # BLD AUTO: 3.01 K/UL
NEUTROPHILS NFR BLD AUTO: 42.8 %
NITRITE URINE: NEGATIVE
NONHDLC SERPL-MCNC: 99 MG/DL
PH URINE: 7
PLATELET # BLD AUTO: 290 K/UL
POTASSIUM SERPL-SCNC: 4.7 MMOL/L
PROT SERPL-MCNC: 7.7 G/DL
PROTEIN URINE: NEGATIVE
PSA SERPL-MCNC: 0.83 NG/ML
RBC # BLD: 5.03 M/UL
RBC # FLD: 12 %
SODIUM SERPL-SCNC: 142 MMOL/L
SPECIFIC GRAVITY URINE: 1.02
TRIGL SERPL-MCNC: 158 MG/DL
TSH SERPL-ACNC: 1.9 UIU/ML
UROBILINOGEN URINE: NORMAL
WBC # FLD AUTO: 7.03 K/UL

## 2022-09-14 ENCOUNTER — NON-APPOINTMENT (OUTPATIENT)
Age: 57
End: 2022-09-14

## 2022-09-14 LAB
25(OH)D3 SERPL-MCNC: 28.5 NG/ML
ESTIMATED AVERAGE GLUCOSE: 126 MG/DL
HBA1C MFR BLD HPLC: 6 %

## 2022-10-05 ENCOUNTER — RX RENEWAL (OUTPATIENT)
Age: 57
End: 2022-10-05

## 2022-10-05 NOTE — H&P PST ADULT - OPHTHALMOLOGIC
negative
This was a shared visit with the CIRILO. I reviewed and verified the documentation and independently performed the documented:

## 2022-10-11 ENCOUNTER — APPOINTMENT (OUTPATIENT)
Dept: CARDIOLOGY | Facility: CLINIC | Age: 57
End: 2022-10-11

## 2022-10-11 ENCOUNTER — NON-APPOINTMENT (OUTPATIENT)
Age: 57
End: 2022-10-11

## 2022-10-11 VITALS
SYSTOLIC BLOOD PRESSURE: 130 MMHG | OXYGEN SATURATION: 98 % | WEIGHT: 173 LBS | BODY MASS INDEX: 26.3 KG/M2 | HEART RATE: 62 BPM | DIASTOLIC BLOOD PRESSURE: 76 MMHG

## 2022-10-11 VITALS — SYSTOLIC BLOOD PRESSURE: 142 MMHG | DIASTOLIC BLOOD PRESSURE: 92 MMHG

## 2022-10-11 PROCEDURE — 93000 ELECTROCARDIOGRAM COMPLETE: CPT

## 2022-10-11 PROCEDURE — 99214 OFFICE O/P EST MOD 30 MIN: CPT

## 2022-10-11 PROCEDURE — 93040 RHYTHM ECG WITH REPORT: CPT | Mod: 59

## 2022-10-11 NOTE — HISTORY OF PRESENT ILLNESS
[FreeTextEntry1] : 3/9/2021 - Office visit:\par He was admitted to [facility] in September 2019 for syncope following initiation of a new medication; he has been unable to determine the identity of the medication.\par He stopped taking hydrochlorothiazide since his last visit with me in May 2020. \par He walks 1-2 miles per day without any symptoms, but does not formally exercise.\par Review of systems is negative. Specifically, he denies chest pain, shortness of breath, palpitations, and dizziness.\par Prior cardiac work-up includes the following:\par Echocardiogram (10/26/2017) - LVEF 65-70%, mild MR\par Stress echocardiogram (2/13/2018) - 10'45" seconds Eric protocol (12 METS); 1.8 mm downsloping ST depressions III and 1.2 mm horizontal ST depressions aVF at peak exercise, resolving by 1 minute recovery; no echocardiographic evidence of ischemia.\par \par 03/24/2022 - Office visit:\par He denies chest pain, shortness of breath, palpitations, and dizziness.\par \par 04/29/2022 - Office visit:\par He is on a higher dose of amlodipine (10 mg daily).\par His blood pressures at home are 130-135/80-85.\par He denies chest pain, shortness of breath, palpitations, and dizziness.\par \par 07/12/2022 - Office visit:\par He has not checked his blood pressure at home for a long time.\par He denies chest pain, shortness of breath, palpitations, and dizziness.\par \par 10/11/2022 - Office visit:\par He is blood pressure with Dr. Michel on 9/14/2022 was 130/76.  His blood pressure during his precolonoscopy evaluation was "normal."  At home, his blood pressures run 128-130/75-85, but he checks it very infrequently.\par He denies chest pain, shortness of breath, palpitations, and dizziness.\par \par \par \par

## 2022-10-13 ENCOUNTER — NON-APPOINTMENT (OUTPATIENT)
Age: 57
End: 2022-10-13

## 2022-10-20 ENCOUNTER — RESULT REVIEW (OUTPATIENT)
Age: 57
End: 2022-10-20

## 2022-11-11 DIAGNOSIS — K57.90 DIVERTICULOSIS OF INTESTINE, PART UNSPECIFIED, W/OUT PERFORATION OR ABSCESS W/OUT BLEEDING: ICD-10-CM

## 2022-11-12 ENCOUNTER — RX RENEWAL (OUTPATIENT)
Age: 57
End: 2022-11-12

## 2022-11-30 ENCOUNTER — APPOINTMENT (OUTPATIENT)
Dept: INTERNAL MEDICINE | Facility: CLINIC | Age: 57
End: 2022-11-30

## 2022-11-30 VITALS — OXYGEN SATURATION: 98 % | DIASTOLIC BLOOD PRESSURE: 80 MMHG | TEMPERATURE: 98.3 F | SYSTOLIC BLOOD PRESSURE: 120 MMHG

## 2022-11-30 VITALS — SYSTOLIC BLOOD PRESSURE: 110 MMHG | DIASTOLIC BLOOD PRESSURE: 80 MMHG

## 2022-11-30 DIAGNOSIS — Z83.2 FAMILY HISTORY OF DISEASES OF THE BLOOD AND BLOOD-FORMING ORGANS AND CERTAIN DISORDERS INVOLVING THE IMMUNE MECHANISM: ICD-10-CM

## 2022-11-30 DIAGNOSIS — Z84.89 FAMILY HISTORY OF OTHER SPECIFIED CONDITIONS: ICD-10-CM

## 2022-11-30 DIAGNOSIS — Z83.3 FAMILY HISTORY OF DIABETES MELLITUS: ICD-10-CM

## 2022-11-30 PROCEDURE — 99213 OFFICE O/P EST LOW 20 MIN: CPT | Mod: 25

## 2022-11-30 PROCEDURE — 36415 COLL VENOUS BLD VENIPUNCTURE: CPT

## 2022-11-30 RX ORDER — LORATADINE 5 MG/5 ML
0.05 SOLUTION, ORAL ORAL
Refills: 0 | Status: ACTIVE | COMMUNITY
Start: 2022-11-30

## 2022-11-30 RX ORDER — FLUTICASONE PROPIONATE 50 UG/1
50 SPRAY, METERED NASAL TWICE DAILY
Qty: 1 | Refills: 1 | Status: ACTIVE | COMMUNITY
Start: 2022-11-30

## 2022-11-30 NOTE — HISTORY OF PRESENT ILLNESS
[FreeTextEntry8] : cc: L. ear pain, dizziness\par \par L. ear pain x 1 week - intermitent - max 5/10 - also left sided neck discomfort\par also past few weeks have noticed intermitent positional dizziness - not every day\par no ha \par no weakness \par \par \par no rhinorrhea\par \par \par father recently  d/t complications of sarcoidosis & DM2\par no f/chills\par no cough \par sees oral surgeon q 12 weeek

## 2022-11-30 NOTE — PHYSICAL EXAM
[No Acute Distress] : no acute distress [Well Nourished] : well nourished [PERRL] : pupils equal round and reactive to light [EOMI] : extraocular movements intact [Normal Oropharynx] : the oropharynx was normal [No Lymphadenopathy] : no lymphadenopathy [Supple] : supple [Normal] : the cranial nerves were intact [Sensation Tactile Decrease] : light touch was intact [2+] : left 2+ [Normal Affect] : the affect was normal [Normal Insight/Judgement] : insight and judgment were intact [de-identified] : b/l serous effusion (L>R)

## 2022-12-02 LAB
ALBUMIN SERPL ELPH-MCNC: 4.8 G/DL
ALP BLD-CCNC: 77 U/L
ALT SERPL-CCNC: 25 U/L
ANION GAP SERPL CALC-SCNC: 17 MMOL/L
AST SERPL-CCNC: 16 U/L
BASOPHILS # BLD AUTO: 0.06 K/UL
BASOPHILS NFR BLD AUTO: 0.9 %
BILIRUB SERPL-MCNC: 0.4 MG/DL
BUN SERPL-MCNC: 15 MG/DL
CALCIUM SERPL-MCNC: 9.5 MG/DL
CHLORIDE SERPL-SCNC: 102 MMOL/L
CO2 SERPL-SCNC: 24 MMOL/L
CREAT SERPL-MCNC: 1.13 MG/DL
EGFR: 75 ML/MIN/1.73M2
EOSINOPHIL # BLD AUTO: 0.29 K/UL
EOSINOPHIL NFR BLD AUTO: 4.4 %
FOLATE SERPL-MCNC: 17.2 NG/ML
GLUCOSE SERPL-MCNC: 70 MG/DL
HCT VFR BLD CALC: 44.5 %
HGB BLD-MCNC: 14.8 G/DL
IMM GRANULOCYTES NFR BLD AUTO: 0.2 %
LYMPHOCYTES # BLD AUTO: 2.79 K/UL
LYMPHOCYTES NFR BLD AUTO: 42.3 %
MAN DIFF?: NORMAL
MCHC RBC-ENTMCNC: 30.5 PG
MCHC RBC-ENTMCNC: 33.3 GM/DL
MCV RBC AUTO: 91.8 FL
MONOCYTES # BLD AUTO: 0.51 K/UL
MONOCYTES NFR BLD AUTO: 7.7 %
NEUTROPHILS # BLD AUTO: 2.94 K/UL
NEUTROPHILS NFR BLD AUTO: 44.5 %
PLATELET # BLD AUTO: 310 K/UL
POTASSIUM SERPL-SCNC: 4.5 MMOL/L
PROT SERPL-MCNC: 7.8 G/DL
RBC # BLD: 4.85 M/UL
RBC # FLD: 12.9 %
SODIUM SERPL-SCNC: 143 MMOL/L
TSH SERPL-ACNC: 2.26 UIU/ML
VIT B12 SERPL-MCNC: 692 PG/ML
WBC # FLD AUTO: 6.6 K/UL

## 2022-12-05 ENCOUNTER — NON-APPOINTMENT (OUTPATIENT)
Age: 57
End: 2022-12-05

## 2022-12-05 ENCOUNTER — APPOINTMENT (OUTPATIENT)
Dept: CARDIOLOGY | Facility: CLINIC | Age: 57
End: 2022-12-05

## 2022-12-05 VITALS — DIASTOLIC BLOOD PRESSURE: 86 MMHG | SYSTOLIC BLOOD PRESSURE: 124 MMHG

## 2022-12-05 VITALS
HEART RATE: 78 BPM | DIASTOLIC BLOOD PRESSURE: 68 MMHG | WEIGHT: 174 LBS | OXYGEN SATURATION: 98 % | BODY MASS INDEX: 26.46 KG/M2 | SYSTOLIC BLOOD PRESSURE: 130 MMHG

## 2022-12-05 VITALS — SYSTOLIC BLOOD PRESSURE: 126 MMHG | DIASTOLIC BLOOD PRESSURE: 86 MMHG

## 2022-12-05 PROCEDURE — 93000 ELECTROCARDIOGRAM COMPLETE: CPT

## 2022-12-05 PROCEDURE — 93040 RHYTHM ECG WITH REPORT: CPT | Mod: 59

## 2022-12-05 PROCEDURE — 99214 OFFICE O/P EST MOD 30 MIN: CPT

## 2022-12-05 NOTE — H&P PST ADULT - RS GEN PE MLT RESP DETAILS PC
Yes airway patent/breath sounds equal/respirations non-labored/clear to auscultation bilaterally/no wheezes

## 2022-12-05 NOTE — HISTORY OF PRESENT ILLNESS
[FreeTextEntry1] : 3/9/2021 - Office visit:\par He was admitted to [facility] in September 2019 for syncope following initiation of a new medication; he has been unable to determine the identity of the medication.\par He stopped taking hydrochlorothiazide since his last visit with me in May 2020. \par He walks 1-2 miles per day without any symptoms, but does not formally exercise.\par Review of systems is negative. Specifically, he denies chest pain, shortness of breath, palpitations, and dizziness.\par Prior cardiac work-up includes the following:\par Echocardiogram (10/26/2017) - LVEF 65-70%, mild MR\par Stress echocardiogram (2/13/2018) - 10'45" seconds Eric protocol (12 METS); 1.8 mm downsloping ST depressions III and 1.2 mm horizontal ST depressions aVF at peak exercise, resolving by 1 minute recovery; no echocardiographic evidence of ischemia.\par \par 03/24/2022 - Office visit:\par He denies chest pain, shortness of breath, palpitations, and dizziness.\par \par 04/29/2022 - Office visit:\par He is on a higher dose of amlodipine (10 mg daily).\par His blood pressures at home are 130-135/80-85.\par He denies chest pain, shortness of breath, palpitations, and dizziness.\par \par 07/12/2022 - Office visit:\par He has not checked his blood pressure at home for a long time.\par He denies chest pain, shortness of breath, palpitations, and dizziness.\par \par 10/11/2022 - Office visit:\par He is blood pressure with Dr. Michel on 9/14/2022 was 130/76.  His blood pressure during his precolonoscopy evaluation was "normal."  At home, his blood pressures run 128-130/75-85, but he checks it very infrequently.\par He denies chest pain, shortness of breath, palpitations, and dizziness.\par \par 12/05/2022 - Office visit:\par His blood pressures at home have been 125-130/78-80.\par Recently he has had some dizziness with getting up or turning his head, and left ear with left jaw/dental discomfort.  \par He denies chest pain, shortness of breath, and palpitations.\par His father passed away 1 month ago.\par \par \par \par \par

## 2022-12-10 ENCOUNTER — NON-APPOINTMENT (OUTPATIENT)
Age: 57
End: 2022-12-10

## 2022-12-13 ENCOUNTER — TRANSCRIPTION ENCOUNTER (OUTPATIENT)
Age: 57
End: 2022-12-13

## 2022-12-13 LAB
ESTIMATED AVERAGE GLUCOSE: 120 MG/DL
HBA1C MFR BLD HPLC: 5.8 %

## 2023-03-25 ENCOUNTER — RX RENEWAL (OUTPATIENT)
Age: 58
End: 2023-03-25

## 2023-06-05 ENCOUNTER — APPOINTMENT (OUTPATIENT)
Dept: CARDIOLOGY | Facility: CLINIC | Age: 58
End: 2023-06-05
Payer: COMMERCIAL

## 2023-08-01 ENCOUNTER — APPOINTMENT (OUTPATIENT)
Dept: CARDIOLOGY | Facility: CLINIC | Age: 58
End: 2023-08-01
Payer: COMMERCIAL

## 2023-08-01 VITALS
SYSTOLIC BLOOD PRESSURE: 160 MMHG | HEART RATE: 76 BPM | BODY MASS INDEX: 26.91 KG/M2 | OXYGEN SATURATION: 99 % | WEIGHT: 177 LBS | DIASTOLIC BLOOD PRESSURE: 84 MMHG

## 2023-08-01 VITALS — SYSTOLIC BLOOD PRESSURE: 124 MMHG | DIASTOLIC BLOOD PRESSURE: 88 MMHG

## 2023-08-01 PROCEDURE — 93040 RHYTHM ECG WITH REPORT: CPT | Mod: 59

## 2023-08-01 PROCEDURE — 93000 ELECTROCARDIOGRAM COMPLETE: CPT

## 2023-08-01 PROCEDURE — 99214 OFFICE O/P EST MOD 30 MIN: CPT

## 2023-08-01 NOTE — HISTORY OF PRESENT ILLNESS
[FreeTextEntry1] : 3/9/2021 - Office visit: He was admitted to [facility] in September 2019 for syncope following initiation of a new medication; he has been unable to determine the identity of the medication. He stopped taking hydrochlorothiazide since his last visit with me in May 2020.  He walks 1-2 miles per day without any symptoms, but does not formally exercise. Review of systems is negative. Specifically, he denies chest pain, shortness of breath, palpitations, and dizziness. Prior cardiac work-up includes the following: Echocardiogram (10/26/2017) - LVEF 65-70%, mild MR Stress echocardiogram (2/13/2018) - 10'45" seconds Eric protocol (12 METS); 1.8 mm downsloping ST depressions III and 1.2 mm horizontal ST depressions aVF at peak exercise, resolving by 1 minute recovery; no echocardiographic evidence of ischemia.  03/24/2022 - Office visit: He denies chest pain, shortness of breath, palpitations, and dizziness.  04/29/2022 - Office visit: He is on a higher dose of amlodipine (10 mg daily). His blood pressures at home are 130-135/80-85. He denies chest pain, shortness of breath, palpitations, and dizziness.  07/12/2022 - Office visit: He has not checked his blood pressure at home for a long time. He denies chest pain, shortness of breath, palpitations, and dizziness.  10/11/2022 - Office visit: He is blood pressure with Dr. Michel on 9/14/2022 was 130/76.  His blood pressure during his precolonoscopy evaluation was "normal."  At home, his blood pressures run 128-130/75-85, but he checks it very infrequently. He denies chest pain, shortness of breath, palpitations, and dizziness.  12/05/2022 - Office visit: His blood pressures at home have been 125-130/78-80. Recently he has had some dizziness with getting up or turning his head, and left ear with left jaw/dental discomfort.   He denies chest pain, shortness of breath, and palpitations. His father passed away 1 month ago.  08/01/2023 - Office visit: Low-carb diet. 2 miles treadmill daily. He denies chest pain, shortness of breath, and palpitations.

## 2023-10-28 ENCOUNTER — EMERGENCY (EMERGENCY)
Facility: HOSPITAL | Age: 58
LOS: 1 days | Discharge: HOME | End: 2023-10-28
Payer: COMMERCIAL

## 2023-10-28 VITALS
SYSTOLIC BLOOD PRESSURE: 155 MMHG | TEMPERATURE: 98 F | RESPIRATION RATE: 17 BRPM | DIASTOLIC BLOOD PRESSURE: 92 MMHG | OXYGEN SATURATION: 96 % | HEART RATE: 77 BPM

## 2023-10-28 VITALS
WEIGHT: 169.98 LBS | TEMPERATURE: 98 F | SYSTOLIC BLOOD PRESSURE: 161 MMHG | HEIGHT: 69 IN | HEART RATE: 90 BPM | DIASTOLIC BLOOD PRESSURE: 82 MMHG | RESPIRATION RATE: 20 BRPM | OXYGEN SATURATION: 100 %

## 2023-10-28 DIAGNOSIS — Z98.890 OTHER SPECIFIED POSTPROCEDURAL STATES: Chronic | ICD-10-CM

## 2023-10-28 LAB
ALBUMIN SERPL ELPH-MCNC: 4.7 G/DL — SIGNIFICANT CHANGE UP (ref 3.3–5)
ALBUMIN SERPL ELPH-MCNC: 4.7 G/DL — SIGNIFICANT CHANGE UP (ref 3.3–5)
ALP SERPL-CCNC: 55 U/L — SIGNIFICANT CHANGE UP (ref 40–120)
ALP SERPL-CCNC: 55 U/L — SIGNIFICANT CHANGE UP (ref 40–120)
ALT FLD-CCNC: 31 U/L — SIGNIFICANT CHANGE UP (ref 10–45)
ALT FLD-CCNC: 31 U/L — SIGNIFICANT CHANGE UP (ref 10–45)
ANION GAP SERPL CALC-SCNC: 10 MMOL/L — SIGNIFICANT CHANGE UP (ref 5–17)
ANION GAP SERPL CALC-SCNC: 10 MMOL/L — SIGNIFICANT CHANGE UP (ref 5–17)
AST SERPL-CCNC: 22 U/L — SIGNIFICANT CHANGE UP (ref 10–40)
AST SERPL-CCNC: 22 U/L — SIGNIFICANT CHANGE UP (ref 10–40)
BASOPHILS # BLD AUTO: 0.04 K/UL — SIGNIFICANT CHANGE UP (ref 0–0.2)
BASOPHILS # BLD AUTO: 0.04 K/UL — SIGNIFICANT CHANGE UP (ref 0–0.2)
BASOPHILS NFR BLD AUTO: 0.6 % — SIGNIFICANT CHANGE UP (ref 0–2)
BASOPHILS NFR BLD AUTO: 0.6 % — SIGNIFICANT CHANGE UP (ref 0–2)
BILIRUB SERPL-MCNC: 0.6 MG/DL — SIGNIFICANT CHANGE UP (ref 0.2–1.2)
BILIRUB SERPL-MCNC: 0.6 MG/DL — SIGNIFICANT CHANGE UP (ref 0.2–1.2)
BUN SERPL-MCNC: 12 MG/DL — SIGNIFICANT CHANGE UP (ref 7–23)
BUN SERPL-MCNC: 12 MG/DL — SIGNIFICANT CHANGE UP (ref 7–23)
CALCIUM SERPL-MCNC: 9.2 MG/DL — SIGNIFICANT CHANGE UP (ref 8.4–10.5)
CALCIUM SERPL-MCNC: 9.2 MG/DL — SIGNIFICANT CHANGE UP (ref 8.4–10.5)
CHLORIDE SERPL-SCNC: 101 MMOL/L — SIGNIFICANT CHANGE UP (ref 96–108)
CHLORIDE SERPL-SCNC: 101 MMOL/L — SIGNIFICANT CHANGE UP (ref 96–108)
CO2 SERPL-SCNC: 27 MMOL/L — SIGNIFICANT CHANGE UP (ref 22–31)
CO2 SERPL-SCNC: 27 MMOL/L — SIGNIFICANT CHANGE UP (ref 22–31)
CREAT SERPL-MCNC: 0.95 MG/DL — SIGNIFICANT CHANGE UP (ref 0.5–1.3)
CREAT SERPL-MCNC: 0.95 MG/DL — SIGNIFICANT CHANGE UP (ref 0.5–1.3)
EGFR: 92 ML/MIN/1.73M2 — SIGNIFICANT CHANGE UP
EGFR: 92 ML/MIN/1.73M2 — SIGNIFICANT CHANGE UP
EOSINOPHIL # BLD AUTO: 0.22 K/UL — SIGNIFICANT CHANGE UP (ref 0–0.5)
EOSINOPHIL # BLD AUTO: 0.22 K/UL — SIGNIFICANT CHANGE UP (ref 0–0.5)
EOSINOPHIL NFR BLD AUTO: 3.3 % — SIGNIFICANT CHANGE UP (ref 0–6)
EOSINOPHIL NFR BLD AUTO: 3.3 % — SIGNIFICANT CHANGE UP (ref 0–6)
GLUCOSE SERPL-MCNC: 157 MG/DL — HIGH (ref 70–99)
GLUCOSE SERPL-MCNC: 157 MG/DL — HIGH (ref 70–99)
HCT VFR BLD CALC: 41.8 % — SIGNIFICANT CHANGE UP (ref 39–50)
HCT VFR BLD CALC: 41.8 % — SIGNIFICANT CHANGE UP (ref 39–50)
HGB BLD-MCNC: 14.8 G/DL — SIGNIFICANT CHANGE UP (ref 13–17)
HGB BLD-MCNC: 14.8 G/DL — SIGNIFICANT CHANGE UP (ref 13–17)
IMM GRANULOCYTES NFR BLD AUTO: 0.1 % — SIGNIFICANT CHANGE UP (ref 0–0.9)
IMM GRANULOCYTES NFR BLD AUTO: 0.1 % — SIGNIFICANT CHANGE UP (ref 0–0.9)
LIDOCAIN IGE QN: 25 U/L — SIGNIFICANT CHANGE UP (ref 7–60)
LIDOCAIN IGE QN: 25 U/L — SIGNIFICANT CHANGE UP (ref 7–60)
LYMPHOCYTES # BLD AUTO: 3.03 K/UL — SIGNIFICANT CHANGE UP (ref 1–3.3)
LYMPHOCYTES # BLD AUTO: 3.03 K/UL — SIGNIFICANT CHANGE UP (ref 1–3.3)
LYMPHOCYTES # BLD AUTO: 45.1 % — HIGH (ref 13–44)
LYMPHOCYTES # BLD AUTO: 45.1 % — HIGH (ref 13–44)
MCHC RBC-ENTMCNC: 30.5 PG — SIGNIFICANT CHANGE UP (ref 27–34)
MCHC RBC-ENTMCNC: 30.5 PG — SIGNIFICANT CHANGE UP (ref 27–34)
MCHC RBC-ENTMCNC: 35.4 GM/DL — SIGNIFICANT CHANGE UP (ref 32–36)
MCHC RBC-ENTMCNC: 35.4 GM/DL — SIGNIFICANT CHANGE UP (ref 32–36)
MCV RBC AUTO: 86 FL — SIGNIFICANT CHANGE UP (ref 80–100)
MCV RBC AUTO: 86 FL — SIGNIFICANT CHANGE UP (ref 80–100)
MONOCYTES # BLD AUTO: 0.44 K/UL — SIGNIFICANT CHANGE UP (ref 0–0.9)
MONOCYTES # BLD AUTO: 0.44 K/UL — SIGNIFICANT CHANGE UP (ref 0–0.9)
MONOCYTES NFR BLD AUTO: 6.5 % — SIGNIFICANT CHANGE UP (ref 2–14)
MONOCYTES NFR BLD AUTO: 6.5 % — SIGNIFICANT CHANGE UP (ref 2–14)
NEUTROPHILS # BLD AUTO: 2.98 K/UL — SIGNIFICANT CHANGE UP (ref 1.8–7.4)
NEUTROPHILS # BLD AUTO: 2.98 K/UL — SIGNIFICANT CHANGE UP (ref 1.8–7.4)
NEUTROPHILS NFR BLD AUTO: 44.4 % — SIGNIFICANT CHANGE UP (ref 43–77)
NEUTROPHILS NFR BLD AUTO: 44.4 % — SIGNIFICANT CHANGE UP (ref 43–77)
NRBC # BLD: 0 /100 WBCS — SIGNIFICANT CHANGE UP (ref 0–0)
NRBC # BLD: 0 /100 WBCS — SIGNIFICANT CHANGE UP (ref 0–0)
NT-PROBNP SERPL-SCNC: <36 PG/ML — SIGNIFICANT CHANGE UP (ref 0–300)
NT-PROBNP SERPL-SCNC: <36 PG/ML — SIGNIFICANT CHANGE UP (ref 0–300)
PLATELET # BLD AUTO: 258 K/UL — SIGNIFICANT CHANGE UP (ref 150–400)
PLATELET # BLD AUTO: 258 K/UL — SIGNIFICANT CHANGE UP (ref 150–400)
POTASSIUM SERPL-MCNC: 3.7 MMOL/L — SIGNIFICANT CHANGE UP (ref 3.5–5.3)
POTASSIUM SERPL-MCNC: 3.7 MMOL/L — SIGNIFICANT CHANGE UP (ref 3.5–5.3)
POTASSIUM SERPL-SCNC: 3.7 MMOL/L — SIGNIFICANT CHANGE UP (ref 3.5–5.3)
POTASSIUM SERPL-SCNC: 3.7 MMOL/L — SIGNIFICANT CHANGE UP (ref 3.5–5.3)
PROT SERPL-MCNC: 7.5 G/DL — SIGNIFICANT CHANGE UP (ref 6–8.3)
PROT SERPL-MCNC: 7.5 G/DL — SIGNIFICANT CHANGE UP (ref 6–8.3)
RBC # BLD: 4.86 M/UL — SIGNIFICANT CHANGE UP (ref 4.2–5.8)
RBC # BLD: 4.86 M/UL — SIGNIFICANT CHANGE UP (ref 4.2–5.8)
RBC # FLD: 12.3 % — SIGNIFICANT CHANGE UP (ref 10.3–14.5)
RBC # FLD: 12.3 % — SIGNIFICANT CHANGE UP (ref 10.3–14.5)
SODIUM SERPL-SCNC: 138 MMOL/L — SIGNIFICANT CHANGE UP (ref 135–145)
SODIUM SERPL-SCNC: 138 MMOL/L — SIGNIFICANT CHANGE UP (ref 135–145)
TROPONIN T, HIGH SENSITIVITY RESULT: 7 NG/L — SIGNIFICANT CHANGE UP (ref 0–51)
TROPONIN T, HIGH SENSITIVITY RESULT: 7 NG/L — SIGNIFICANT CHANGE UP (ref 0–51)
TROPONIN T, HIGH SENSITIVITY RESULT: 8 NG/L — SIGNIFICANT CHANGE UP (ref 0–51)
TROPONIN T, HIGH SENSITIVITY RESULT: 8 NG/L — SIGNIFICANT CHANGE UP (ref 0–51)
WBC # BLD: 6.72 K/UL — SIGNIFICANT CHANGE UP (ref 3.8–10.5)
WBC # BLD: 6.72 K/UL — SIGNIFICANT CHANGE UP (ref 3.8–10.5)
WBC # FLD AUTO: 6.72 K/UL — SIGNIFICANT CHANGE UP (ref 3.8–10.5)
WBC # FLD AUTO: 6.72 K/UL — SIGNIFICANT CHANGE UP (ref 3.8–10.5)

## 2023-10-28 PROCEDURE — 85025 COMPLETE CBC W/AUTO DIFF WBC: CPT

## 2023-10-28 PROCEDURE — 71045 X-RAY EXAM CHEST 1 VIEW: CPT

## 2023-10-28 PROCEDURE — 83690 ASSAY OF LIPASE: CPT

## 2023-10-28 PROCEDURE — 84484 ASSAY OF TROPONIN QUANT: CPT

## 2023-10-28 PROCEDURE — 83880 ASSAY OF NATRIURETIC PEPTIDE: CPT

## 2023-10-28 PROCEDURE — 99285 EMERGENCY DEPT VISIT HI MDM: CPT | Mod: 25

## 2023-10-28 PROCEDURE — 80053 COMPREHEN METABOLIC PANEL: CPT

## 2023-10-28 PROCEDURE — 71045 X-RAY EXAM CHEST 1 VIEW: CPT | Mod: 26

## 2023-10-28 PROCEDURE — 36415 COLL VENOUS BLD VENIPUNCTURE: CPT

## 2023-10-28 PROCEDURE — 99285 EMERGENCY DEPT VISIT HI MDM: CPT

## 2023-10-28 PROCEDURE — 93005 ELECTROCARDIOGRAM TRACING: CPT

## 2023-10-28 RX ORDER — ASPIRIN/CALCIUM CARB/MAGNESIUM 324 MG
162 TABLET ORAL ONCE
Refills: 0 | Status: COMPLETED | OUTPATIENT
Start: 2023-10-28 | End: 2023-10-28

## 2023-10-28 RX ADMIN — Medication 162 MILLIGRAM(S): at 13:38

## 2023-10-28 NOTE — ED PROVIDER NOTE - PROGRESS NOTE DETAILS
pt w/ no sx of cp, sob, nausea, vomiting, pt informed of stable troponin, pt ekg w/ no changes, pt reports he doesn't want to stay, he will follow up with Dr. Juárez on Monday, aware that he has Mitral regurg, pt to have outpt follow up w/ PCP Bahman, PGY3 - radiology called, will get it read by an attending, reassess dispo at that time pt w/ no sx of cp, sob, nausea, vomiting, pt informed of stable troponin, pt ekg w/ no changes, pt reports he doesn't want to stay, he will follow up with Dr. Juárez on Monday, aware that he has Mitral regurg, pt to have outpt follow up w/cardiology, pt informed of limited testing today and has not exluded heart attack, pt will return if he develops cp, diaphoresis, nausea or any other concerning symptoms, accompanied by son aware of return precautions Bahman, PGY3 - cxr read by attg shows clear lungs. Patient symptom free at this time. Patient stable for discharge. Understands the Emergency Room work-up and discharge precautions. Will follow-up with pcp Bahman, PGY3 - radiology was called for cxr read possible pneumoperitoneum, will get it read by an attending, reassess dispo at that time

## 2023-10-28 NOTE — ED PROVIDER NOTE - OBJECTIVE STATEMENT
57-year-old man for with PMH HTN, HLD, [place]uvian, presenting due to persisting nausea after a sprint yesterday.  Patient states that he usually runs about a mile, has not run in the last month.  Yesterday went for a sprint of about 1 block at which point he felt lightheaded.  Stopped running and lightheadedness persisted for another 30 seconds.  However after the resolution of lightheadedness started having nausea that has persisted until this morning and decided to come to the emergency room for evaluation.  States that the nausea has improved.  Denies vomiting, chest pain, SOB, diarrhea, abdominal pain.  Follows with cardiology, last stress and echo 2 years ago. 57-year-old man for with PMH HTN, HLD, Erit[place]an, presenting due to persisting nausea after a sprint yesterday.  Patient states that he usually runs about a mile, has not run in the last month.  Yesterday went for a sprint of about 1 block at which point he felt lightheaded.  Stopped running and lightheadedness persisted for another 30 seconds.  However after the resolution of lightheadedness started having nausea that has persisted until this morning and decided to come to the emergency room for evaluation.  States that the nausea has improved.  Denies vomiting, chest pain, SOB, diarrhea, abdominal pain.  Follows with cardiology Dr. Juárez, last stress and echo 2 years ago.

## 2023-10-28 NOTE — ED PROVIDER NOTE - CLINICAL SUMMARY MEDICAL DECISION MAKING FREE TEXT BOX
57-year-old male, with symptoms concerning for angina.  ECG directly visualized by me, rate 85, , QRS 90, QTc 387,  and shows normal sinus rhythm, nonspecific lateral T wave flattening.  No ST elevations or depressions. MDM:  57-year-old male, with symptoms concerning for angina.  ECG directly visualized by me, rate 85, , QRS 90, QTc 387,  and shows normal sinus rhythm, nonspecific lateral T wave flattening.  No ST elevations or depressions.  WOuld recommend CCTA or stress tomorrow. MDM:  57-year-old male, with symptoms concerning for angina.  ECG directly visualized by me, rate 85, , QRS 90, QTc 387,  and shows normal sinus rhythm, nonspecific lateral T wave flattening.  No ST elevations or depressions.  WOuld recommend CCTA or stress tomorrow.    Bahman, PGY3 - persisting MDM:  57-year-old male, with symptoms concerning for angina.  ECG directly visualized by me, rate 85, , QRS 90, QTc 387,  and shows normal sinus rhythm, nonspecific lateral T wave flattening.  No ST elevations or depressions.  WOuld recommend CCTA or stress tomorrow.    Bahman, PGY3 - persisting nausea atypical for anginal episode, however due to patient's cultural background, medical history, no recent stress or echo testing in the last year, consider anginal episode high on the differential.  Patient unwilling to stay due to having game tickets tomorrow morning, will get a repeat troponin for further evaluation and recommend at least observation. *The above represents an initial assessment/impression. Please refer to progress notes for potential changes in patient clinical course* MDM:  57-year-old male, with symptoms concerning for angina.  ECG directly visualized by me, rate 85, , QRS 90, QTc 387,  and shows normal sinus rhythm, nonspecific lateral T wave flattening.  No ST elevations or depressions.  WOuld recommend CCTA or stress tomorrow.    Bahman, PGY3 - persisting nausea atypical for anginal episode, however due to patient's cultural background, medical history, no recent stress or echo testing (done in 2022) Has MR in the last year, consider anginal episode high on the differential.  Patient unwilling to stay due to having game tickets tomorrow morning, will get a repeat troponin for further evaluation and recommend at least observation. *The above represents an initial assessment/impression. Please refer to progress notes for potential changes in patient clinical course*

## 2023-10-28 NOTE — ED PROVIDER NOTE - ATTENDING CONTRIBUTION TO CARE
MD Sarkar:  patient seen and evaluated with the resident.  I was present for key portions of the History & Physical, and I agree with the Impression & Plan.    Patient is a 57-year-old male brought in by family for evaluation of dyspnea on exertion with associated nausea.  Patient gets regular exercise, was building up his regular exercise routine when he developed severe dyspnea and associated nausea.  He immediately stopped jogging, but the symptoms persisted for approximately 45 minutes.    There was no associated chest pain, jaw pain, shoulder pain, or near syncope.    Patient endorses that he follows with a cardiologist, Dr. Juárez, and his last stress test was 1 year ago.    VS: wnl  Gen: adult male, well-appearing appearing in NAD  Head: NC/AT  Neck: trachea midline  Resp:  No distress  Abd: nondistended  Ext: no deformities  Neuro:  A&Ox4 appears non focal  Skin:  Warm and dry as visualized  Psych:  Normal affect and mood     Medical decision makin-year-old male, with symptoms concerning for angina.  ECG directly visualized by me, rate 85, , QRS 90, QTc 387,  and shows normal sinus rhythm, nonspecific lateral T wave flattening.  No ST elevations or depressions. MD Sarkar:  patient seen and evaluated with the resident.  I was present for key portions of the History & Physical, and I agree with the Impression & Plan.    Patient is a 57-year-old male brought in by family for evaluation of dyspnea on exertion with associated nausea.  Patient gets regular exercise, was building up his regular exercise routine.   Onset of symptoms was yesterday evening when he developed severe dyspnea and associated nausea.  He immediately stopped jogging, but the symptoms persisted for approximately 45 minutes.    There was no associated chest pain, jaw pain, shoulder pain, or near syncope.    Patient endorses that he follows with a cardiologist, Dr. Juárez, and his last stress test was 1 year ago.    VS: wnl  Gen: adult male, well-appearing appearing in NAD  Head: NC/AT  Neck: trachea midline  Resp:  No distress  Abd: nondistended  Ext: no deformities  Neuro:  A&Ox4 appears non focal  Skin:  Warm and dry as visualized  Psych:  Normal affect and mood     Medical decision makin-year-old male, with symptoms concerning for angina.  ECG directly visualized by me, rate 85, , QRS 90, QTc 387,  and shows normal sinus rhythm, nonspecific lateral T wave flattening.  No ST elevations or depressions.  WOuld recommend CCTA or stress tomorrow.

## 2023-10-28 NOTE — ED ADULT NURSE NOTE - NS ED NURSE IV DC DT
Nurses Note -- 4 Eyes      9/23/2023   6:24 PM      Skin assessed during: Admit      [x] No Altered Skin Integrity Present    []Prevention Measures Documented      [] Yes- Altered Skin Integrity Present or Discovered   [] LDA Added if Not in Epic (Describe Wound)   [] New Altered Skin Integrity was Present on Admit and Documented in LDA   [] Wound Image Taken    Wound Care Consulted? No    Attending Nurse:  Mercedes Cross RN     Second RN/Staff Member:  Verona Wade RN           
28-Oct-2023 18:00

## 2023-10-28 NOTE — ED ADULT NURSE REASSESSMENT NOTE - NS ED NURSE REASSESS COMMENT FT1
Patient remains stable while in the ED, denies any nausea, sob or chest pain, labs resulted. DC home and instructed to ff-up with cardiologist in 2 3-3 days and verbalized understanding.

## 2023-10-28 NOTE — ED ADULT TRIAGE NOTE - CHIEF COMPLAINT QUOTE
nausea, no vomiting - while out for a run earlier today, hx htn - on amlodipine/losartan/atorvastatin, denies chest pain

## 2023-10-28 NOTE — ED ADULT NURSE NOTE - OBJECTIVE STATEMENT
57M aaox4 ambulatory with h/o HTN (hypertension) Hypercholesteremia Malignant neoplasm of border of tongue Mitral regurgitation Near syncope 9/2019 after starting new blood pressure medication.  pt states cardiac testing was WNL Oral-mouth cancer Premature ventricular contraction follows up with a cardiologist which he saw last March and had an EKG done, came here today for nauseous feeling after he was about to run for a jog. Felt dyspnea on exertion which was resolved. Patient denies any diaphoresis or chest pain, VS WDL. Denies any chills or fever, sob, chest pain abd pain or vomiting.  Rt ac 18g IV access inserted, labs sent pending results.  Plan of care explained to patient and verbalized understanding.

## 2023-10-28 NOTE — ED PROVIDER NOTE - NS ED ROS FT
GENERAL: No fever, no chills  EYES: No change in vision  HEENT: No trouble swallowing or speaking  CARDIAC: No chest pain  PULMONARY: No cough, no SOB  GI: No abdominal pain, +nausea, no vomiting, no diarrhea, no constipation  : No changes in urination  SKIN: No rashes  NEURO: No headache, no numbness  MSK: No joint pain  Otherwise as HPI or negative.

## 2023-10-28 NOTE — ED PROVIDER NOTE - PATIENT PORTAL LINK FT
You can access the FollowMyHealth Patient Portal offered by [facility] by registering at the following website: [email or web address]. By joining [facility]’s FollowMyHealth portal, you will also be able to view your health information using other applications (apps) compatible with our system.

## 2023-10-28 NOTE — ED PROVIDER NOTE - NSFOLLOWUPINSTRUCTIONS_ED_ALL_ED_FT
You were seen in the emergency department.   See doctor vernon on monday.     An evaluation that was performed today did not show that you had a dangerous or life threatening cause of your pain.     PLEASE BE AWARE THAT AN EMERGENCY DEPARTMENT EVALUATION CANNOT FULLY RULE OUT ALL RISK OF POTENTIAL LIFE THREATENING CAUSES OF CHEST PAIN - INCLUDING A HEART ATTACK.     Please make an appointment to see your doctor in the next several days for a complete exam. If you have chest pain, dizziness, shortness of breath, numbness, profuse sweating, or pain that radiates to your arms or jaw - return to the ED promptly. Please ask your doctor for a referral for a stress test to evaluate the blood flow to your heart and possible risks of potential heart disease or heart attack    RETURN TO THE ED RIGHT AWAY IF:  - YOU HAVE FURTHER EPISODES OF CHEST PAIN, or if your pain changes, or radiates to your arm, back or jaw or if you have dizziness / sweating or feel that you may vomit - THIS IS AN EMERGENCY.     Do not wait to see if the pain will go away. Get medical help at once. Call your local emergency services [phone]). Do not drive yourself to the hospital.     RETURN TO THE EMERGENCY DEPARTMENT IF:  - YOU HAVE TROUBLE BREATHING  - YOU FEEL THAT YOUR SYMPTOMS ARE WORSENING  - YOU HAVE FACIAL DROOP OR WEAKNESS ON ONE SIDE OF YOUR BODY  - YOU HAVE FEVERS OVER 100.5 THAT DO NOT GO DOWN WITH MOTRIN OR TYLENOL  - YOU HAVE CONTINUED VOMITING OR DIARRHEA AND YOU CANNOT TOLERATE DRINKING LIQUIDS    YOU MAY ALWAYS RETURN TO THE ED IF YOU FEEL SICK OR HAVE CONCERNS You were seen in the emergency department for nausea.   See doctor Juárez on Monday.     An evaluation that was performed today did not show that you had a dangerous or life threatening cause of your pain.     PLEASE BE AWARE THAT AN EMERGENCY DEPARTMENT EVALUATION CANNOT FULLY RULE OUT ALL RISK OF POTENTIAL LIFE THREATENING CAUSES OF CHEST PAIN - INCLUDING A HEART ATTACK.     Please make an appointment to see your doctor in the next several days for a complete exam. If you have chest pain, dizziness, shortness of breath, numbness, profuse sweating, or pain that radiates to your arms or jaw - return to the ED promptly. Please ask your doctor for a referral for a stress test to evaluate the blood flow to your heart and possible risks of potential heart disease or heart attack    RETURN TO THE ED RIGHT AWAY IF:  - YOU HAVE FURTHER EPISODES OF CHEST PAIN, or if your pain changes, or radiates to your arm, back or jaw or if you have dizziness / sweating or feel that you may vomit - THIS IS AN EMERGENCY.     Do not wait to see if the pain will go away. Get medical help at once. Call your local emergency services ([phone]). Do not drive yourself to the hospital.     RETURN TO THE EMERGENCY DEPARTMENT IF:  - YOU HAVE TROUBLE BREATHING  - YOU FEEL THAT YOUR SYMPTOMS ARE WORSENING  - YOU HAVE FACIAL DROOP OR WEAKNESS ON ONE SIDE OF YOUR BODY  - YOU HAVE FEVERS OVER 100.5 THAT DO NOT GO DOWN WITH MOTRIN OR TYLENOL  - YOU HAVE CONTINUED VOMITING OR DIARRHEA AND YOU CANNOT TOLERATE DRINKING LIQUIDS    YOU MAY ALWAYS RETURN TO THE ED IF YOU FEEL SICK OR HAVE CONCERNS

## 2023-10-28 NOTE — ED ADULT NURSE NOTE - NSICDXPASTMEDICALHX_GEN_ALL_CORE_FT
PAST MEDICAL HISTORY:  HTN (hypertension)     Hypercholesteremia     Malignant neoplasm of border of tongue     Mitral regurgitation     Near syncope 9/2019 after starting new blood pressure medication.  pt states cardiac testing was WNL    Oral-mouth cancer     Premature ventricular contraction

## 2023-10-28 NOTE — ED PROVIDER NOTE - CHIEF COMPLAINT
The patient is a 57y Male complaining of  Rinvoq Pregnancy And Lactation Text: Based on animal studies, Rinvoq may cause embryo-fetal harm when administered to pregnant women.  The medication should not be used in pregnancy.  Breastfeeding is not recommended during treatment and for 6 days after the last dose.

## 2023-10-30 PROBLEM — C06.9 MALIGNANT NEOPLASM OF MOUTH, UNSPECIFIED: Chronic | Status: ACTIVE | Noted: 2023-10-28

## 2023-11-04 ENCOUNTER — RX RENEWAL (OUTPATIENT)
Age: 58
End: 2023-11-04

## 2023-11-04 RX ORDER — ATORVASTATIN CALCIUM 20 MG/1
20 TABLET, FILM COATED ORAL
Qty: 90 | Refills: 2 | Status: ACTIVE | COMMUNITY
Start: 2021-03-19 | End: 1900-01-01

## 2023-11-07 ENCOUNTER — APPOINTMENT (OUTPATIENT)
Dept: CARDIOLOGY | Facility: CLINIC | Age: 58
End: 2023-11-07
Payer: COMMERCIAL

## 2023-11-07 ENCOUNTER — NON-APPOINTMENT (OUTPATIENT)
Age: 58
End: 2023-11-07

## 2023-11-07 VITALS
HEART RATE: 80 BPM | BODY MASS INDEX: 26.76 KG/M2 | DIASTOLIC BLOOD PRESSURE: 82 MMHG | OXYGEN SATURATION: 98 % | SYSTOLIC BLOOD PRESSURE: 120 MMHG | WEIGHT: 176 LBS

## 2023-11-07 VITALS — DIASTOLIC BLOOD PRESSURE: 92 MMHG | SYSTOLIC BLOOD PRESSURE: 132 MMHG

## 2023-11-07 DIAGNOSIS — R42 DIZZINESS AND GIDDINESS: ICD-10-CM

## 2023-11-07 DIAGNOSIS — I10 ESSENTIAL (PRIMARY) HYPERTENSION: ICD-10-CM

## 2023-11-07 DIAGNOSIS — I49.3 VENTRICULAR PREMATURE DEPOLARIZATION: ICD-10-CM

## 2023-11-07 DIAGNOSIS — I34.0 NONRHEUMATIC MITRAL (VALVE) INSUFFICIENCY: ICD-10-CM

## 2023-11-07 PROCEDURE — 99214 OFFICE O/P EST MOD 30 MIN: CPT

## 2023-11-07 PROCEDURE — 93000 ELECTROCARDIOGRAM COMPLETE: CPT

## 2023-11-07 PROCEDURE — 93040 RHYTHM ECG WITH REPORT: CPT | Mod: 59

## 2023-11-08 ENCOUNTER — APPOINTMENT (OUTPATIENT)
Dept: NEUROLOGY | Facility: CLINIC | Age: 58
End: 2023-11-08
Payer: COMMERCIAL

## 2023-11-08 ENCOUNTER — RESULT REVIEW (OUTPATIENT)
Age: 58
End: 2023-11-08

## 2023-11-08 ENCOUNTER — NON-APPOINTMENT (OUTPATIENT)
Age: 58
End: 2023-11-08

## 2023-11-08 ENCOUNTER — APPOINTMENT (OUTPATIENT)
Dept: CARDIOLOGY | Facility: CLINIC | Age: 58
End: 2023-11-08
Payer: COMMERCIAL

## 2023-11-08 VITALS
WEIGHT: 176 LBS | SYSTOLIC BLOOD PRESSURE: 147 MMHG | BODY MASS INDEX: 26.67 KG/M2 | HEIGHT: 68 IN | DIASTOLIC BLOOD PRESSURE: 83 MMHG

## 2023-11-08 PROCEDURE — 93306 TTE W/DOPPLER COMPLETE: CPT

## 2023-11-08 PROCEDURE — 99204 OFFICE O/P NEW MOD 45 MIN: CPT

## 2023-11-16 ENCOUNTER — OUTPATIENT (OUTPATIENT)
Dept: OUTPATIENT SERVICES | Facility: HOSPITAL | Age: 58
LOS: 1 days | End: 2023-11-16
Payer: COMMERCIAL

## 2023-11-16 ENCOUNTER — APPOINTMENT (OUTPATIENT)
Dept: MRI IMAGING | Facility: CLINIC | Age: 58
End: 2023-11-16
Payer: COMMERCIAL

## 2023-11-16 ENCOUNTER — NON-APPOINTMENT (OUTPATIENT)
Age: 58
End: 2023-11-16

## 2023-11-16 DIAGNOSIS — R42 DIZZINESS AND GIDDINESS: ICD-10-CM

## 2023-11-16 DIAGNOSIS — Z98.890 OTHER SPECIFIED POSTPROCEDURAL STATES: Chronic | ICD-10-CM

## 2023-11-16 PROCEDURE — 70553 MRI BRAIN STEM W/O & W/DYE: CPT

## 2023-11-16 PROCEDURE — 70544 MR ANGIOGRAPHY HEAD W/O DYE: CPT

## 2023-11-16 PROCEDURE — A9585: CPT

## 2023-11-16 PROCEDURE — 70548 MR ANGIOGRAPHY NECK W/DYE: CPT | Mod: 26

## 2023-11-16 PROCEDURE — 70548 MR ANGIOGRAPHY NECK W/DYE: CPT

## 2023-11-16 PROCEDURE — 70544 MR ANGIOGRAPHY HEAD W/O DYE: CPT | Mod: 26,59

## 2023-11-16 PROCEDURE — 70553 MRI BRAIN STEM W/O & W/DYE: CPT | Mod: 26,76

## 2023-11-20 ENCOUNTER — APPOINTMENT (OUTPATIENT)
Dept: CARDIOLOGY | Facility: CLINIC | Age: 58
End: 2023-11-20
Payer: COMMERCIAL

## 2023-11-20 PROCEDURE — 93015 CV STRESS TEST SUPVJ I&R: CPT

## 2023-11-20 PROCEDURE — A9500: CPT

## 2023-11-20 PROCEDURE — 78452 HT MUSCLE IMAGE SPECT MULT: CPT

## 2023-12-05 ENCOUNTER — APPOINTMENT (OUTPATIENT)
Dept: INTERNAL MEDICINE | Facility: CLINIC | Age: 58
End: 2023-12-05
Payer: COMMERCIAL

## 2023-12-05 PROCEDURE — 36415 COLL VENOUS BLD VENIPUNCTURE: CPT

## 2023-12-13 ENCOUNTER — NON-APPOINTMENT (OUTPATIENT)
Age: 58
End: 2023-12-13

## 2023-12-13 ENCOUNTER — APPOINTMENT (OUTPATIENT)
Dept: INTERNAL MEDICINE | Facility: CLINIC | Age: 58
End: 2023-12-13
Payer: COMMERCIAL

## 2023-12-13 VITALS
DIASTOLIC BLOOD PRESSURE: 80 MMHG | SYSTOLIC BLOOD PRESSURE: 120 MMHG | HEIGHT: 67.5 IN | WEIGHT: 175 LBS | BODY MASS INDEX: 27.15 KG/M2

## 2023-12-13 DIAGNOSIS — Z00.00 ENCOUNTER FOR GENERAL ADULT MEDICAL EXAMINATION W/OUT ABNORMAL FINDINGS: ICD-10-CM

## 2023-12-13 DIAGNOSIS — H92.02 OTALGIA, LEFT EAR: ICD-10-CM

## 2023-12-13 LAB
25(OH)D3 SERPL-MCNC: 27 NG/ML
ALBUMIN SERPL ELPH-MCNC: 4.5 G/DL
ALP BLD-CCNC: 79 U/L
ALT SERPL-CCNC: 23 U/L
ANION GAP SERPL CALC-SCNC: 9 MMOL/L
APPEARANCE: CLEAR
AST SERPL-CCNC: 17 U/L
BASOPHILS # BLD AUTO: 0.04 K/UL
BASOPHILS NFR BLD AUTO: 0.6 %
BILIRUB SERPL-MCNC: 0.4 MG/DL
BILIRUBIN URINE: NEGATIVE
BLOOD URINE: NEGATIVE
BUN SERPL-MCNC: 17 MG/DL
CALCIUM SERPL-MCNC: 9.2 MG/DL
CHLORIDE SERPL-SCNC: 103 MMOL/L
CHOLEST SERPL-MCNC: 144 MG/DL
CO2 SERPL-SCNC: 27 MMOL/L
COLOR: YELLOW
CREAT SERPL-MCNC: 1.07 MG/DL
EGFR: 79 ML/MIN/1.73M2
EOSINOPHIL # BLD AUTO: 0.24 K/UL
EOSINOPHIL NFR BLD AUTO: 3.7 %
ESTIMATED AVERAGE GLUCOSE: 117 MG/DL
GLUCOSE QUALITATIVE U: NEGATIVE MG/DL
GLUCOSE SERPL-MCNC: 117 MG/DL
HBA1C MFR BLD HPLC: 5.7 %
HCT VFR BLD CALC: 40.1 %
HDLC SERPL-MCNC: 54 MG/DL
HGB BLD-MCNC: 13.7 G/DL
IMM GRANULOCYTES NFR BLD AUTO: 0.2 %
KETONES URINE: NEGATIVE MG/DL
LDLC SERPL CALC-MCNC: 70 MG/DL
LEUKOCYTE ESTERASE URINE: NEGATIVE
LYMPHOCYTES # BLD AUTO: 2.73 K/UL
LYMPHOCYTES NFR BLD AUTO: 42.3 %
MAN DIFF?: NORMAL
MCHC RBC-ENTMCNC: 30.5 PG
MCHC RBC-ENTMCNC: 34.2 GM/DL
MCV RBC AUTO: 89.3 FL
MONOCYTES # BLD AUTO: 0.43 K/UL
MONOCYTES NFR BLD AUTO: 6.7 %
NEUTROPHILS # BLD AUTO: 3 K/UL
NEUTROPHILS NFR BLD AUTO: 46.5 %
NITRITE URINE: NEGATIVE
NONHDLC SERPL-MCNC: 91 MG/DL
PH URINE: 6.5
PLATELET # BLD AUTO: 268 K/UL
POTASSIUM SERPL-SCNC: 4.5 MMOL/L
PROT SERPL-MCNC: 7.2 G/DL
PROTEIN URINE: NEGATIVE MG/DL
PSA SERPL-MCNC: 0.41 NG/ML
RBC # BLD: 4.49 M/UL
RBC # FLD: 12.8 %
SODIUM SERPL-SCNC: 139 MMOL/L
SPECIFIC GRAVITY URINE: 1.01
T4 SERPL-MCNC: 7 UG/DL
TRIGL SERPL-MCNC: 115 MG/DL
TSH SERPL-ACNC: 1.8 UIU/ML
UROBILINOGEN URINE: 0.2 MG/DL
WBC # FLD AUTO: 6.45 K/UL

## 2023-12-13 PROCEDURE — G0444 DEPRESSION SCREEN ANNUAL: CPT | Mod: 59

## 2023-12-13 PROCEDURE — 99396 PREV VISIT EST AGE 40-64: CPT | Mod: 25

## 2023-12-13 NOTE — ASSESSMENT
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

## 2023-12-13 NOTE — PHYSICAL EXAM
[No Acute Distress] : no acute distress [PERRL] : pupils equal round and reactive to light [Normal Oropharynx] : the oropharynx was normal [Normal TMs] : both tympanic membranes were normal [Normal Nasal Mucosa] : the nasal mucosa was normal [No Lymphadenopathy] : no lymphadenopathy [Supple] : supple [Thyroid Normal, No Nodules] : the thyroid was normal and there were no nodules present [No Accessory Muscle Use] : no accessory muscle use [Clear to Auscultation] : lungs were clear to auscultation bilaterally [Regular Rhythm] : with a regular rhythm [Normal S1, S2] : normal S1 and S2 [No Murmur] : no murmur heard [No Edema] : there was no peripheral edema [Soft] : abdomen soft [Non Tender] : non-tender [Non-distended] : non-distended [No Masses] : no abdominal mass palpated [No HSM] : no HSM [Normal Bowel Sounds] : normal bowel sounds [Penis Abnormality] : normal circumcised penis [Testes Tenderness] : no tenderness of the testes [Testes Mass (___cm)] : there were no testicular masses [Prostate Size ___ gm] : prostate size [unfilled] gm [Normal Supraclavicular Nodes] : no supraclavicular lymphadenopathy [Normal Posterior Cervical Nodes] : no posterior cervical lymphadenopathy [Normal Anterior Cervical Nodes] : no anterior cervical lymphadenopathy [Normal Affect] : the affect was normal [Normal Insight/Judgement] : insight and judgment were intact

## 2023-12-13 NOTE — HISTORY OF PRESENT ILLNESS
[FreeTextEntry1] : CPE  [de-identified] : Diet: good  Exercise: playing golf, running   in interval - had episode of dizziness that resolved saw cardiologist - had normal cardiac w/u including stress test also saw neurologist Dr. Del Rio had brain MRA - diagnosed with BPPV or viral labyrinthitis - has neurology f/u

## 2023-12-14 ENCOUNTER — RX RENEWAL (OUTPATIENT)
Age: 58
End: 2023-12-14

## 2023-12-20 ENCOUNTER — NON-APPOINTMENT (OUTPATIENT)
Age: 58
End: 2023-12-20

## 2023-12-23 ENCOUNTER — RX RENEWAL (OUTPATIENT)
Age: 58
End: 2023-12-23

## 2023-12-23 RX ORDER — AMLODIPINE BESYLATE 10 MG/1
10 TABLET ORAL
Qty: 90 | Refills: 3 | Status: ACTIVE | COMMUNITY
Start: 2020-11-25 | End: 1900-01-01

## 2023-12-27 ENCOUNTER — RX RENEWAL (OUTPATIENT)
Age: 58
End: 2023-12-27

## 2023-12-27 RX ORDER — LOSARTAN POTASSIUM 100 MG/1
100 TABLET, FILM COATED ORAL DAILY
Qty: 90 | Refills: 2 | Status: ACTIVE | COMMUNITY
Start: 2021-03-19 | End: 1900-01-01

## 2024-01-16 ENCOUNTER — APPOINTMENT (OUTPATIENT)
Age: 59
End: 2024-01-16

## 2024-01-28 ENCOUNTER — RESULT CHARGE (OUTPATIENT)
Age: 59
End: 2024-01-28

## 2024-01-29 PROBLEM — R42 DIZZINESS: Status: ACTIVE | Noted: 2022-11-30

## 2024-01-29 NOTE — HISTORY OF PRESENT ILLNESS
[FreeTextEntry1] : 3/9/2021 - Office visit: He was admitted to [facility] in September 2019 for syncope following initiation of a new medication; he has been unable to determine the identity of the medication. He stopped taking hydrochlorothiazide since his last visit with me in May 2020.  He walks 1-2 miles per day without any symptoms, but does not formally exercise. Review of systems is negative. Specifically, he denies chest pain, shortness of breath, palpitations, and dizziness. Prior cardiac work-up includes the following: Echocardiogram (10/26/2017) - LVEF 65-70%, mild MR Stress echocardiogram (2/13/2018) - 10'45" seconds Eric protocol (12 METS); 1.8 mm downsloping ST depressions III and 1.2 mm horizontal ST depressions aVF at peak exercise, resolving by 1 minute recovery; no echocardiographic evidence of ischemia.  03/24/2022 - Office visit: He denies chest pain, shortness of breath, palpitations, and dizziness.  04/29/2022 - Office visit: He is on a higher dose of amlodipine (10 mg daily). His blood pressures at home are 130-135/80-85. He denies chest pain, shortness of breath, palpitations, and dizziness.  07/12/2022 - Office visit: He has not checked his blood pressure at home for a long time. He denies chest pain, shortness of breath, palpitations, and dizziness.  10/11/2022 - Office visit: He is blood pressure with Dr. Michel on 9/14/2022 was 130/76.  His blood pressure during his precolonoscopy evaluation was "normal."  At home, his blood pressures run 128-130/75-85, but he checks it very infrequently. He denies chest pain, shortness of breath, palpitations, and dizziness.  12/05/2022 - Office visit: His blood pressures at home have been 125-130/78-80. Recently he has had some dizziness with getting up or turning his head, and left ear with left jaw/dental discomfort.   He denies chest pain, shortness of breath, and palpitations. His father passed away 1 month ago.  08/01/2023 - Office visit: Low-carb diet. 2 miles treadmill daily. He denies chest pain, shortness of breath, and palpitations.  11/07/2023 - Office visit: On 10/27/2023 when sprinting 30 yards to get to his car, he felt as if he was going to pass out.  He had head spinning. nausea, and a racing heart lasting several minutes.  Later that day, he experienced weakness, spinning, and felt about to pass out when standing up. Since then, he has experienced a "weird" feeling in his head and feels as if he may pass out if he stands up and turns. Out of concern, he went to the [facility] ER on 10/28/2023.  Troponins were negative.  Admission was advised with the intent of a stress test or CCTA on 10/29/2023, but the patient opted to go home instead. He has not experienced any chest pain or shortness of breath.

## 2024-02-15 ENCOUNTER — APPOINTMENT (OUTPATIENT)
Dept: NEUROLOGY | Facility: CLINIC | Age: 59
End: 2024-02-15

## 2024-03-25 ENCOUNTER — APPOINTMENT (OUTPATIENT)
Age: 59
End: 2024-03-25

## 2024-03-25 PROCEDURE — 99212 OFFICE O/P EST SF 10 MIN: CPT

## 2024-05-10 ENCOUNTER — APPOINTMENT (OUTPATIENT)
Dept: INTERNAL MEDICINE | Facility: CLINIC | Age: 59
End: 2024-05-10
Payer: COMMERCIAL

## 2024-05-10 VITALS
HEART RATE: 97 BPM | TEMPERATURE: 98.5 F | SYSTOLIC BLOOD PRESSURE: 120 MMHG | OXYGEN SATURATION: 98 % | WEIGHT: 177 LBS | DIASTOLIC BLOOD PRESSURE: 80 MMHG | BODY MASS INDEX: 27.46 KG/M2 | HEIGHT: 67.5 IN

## 2024-05-10 DIAGNOSIS — R73.03 PREDIABETES.: ICD-10-CM

## 2024-05-10 DIAGNOSIS — E78.00 PURE HYPERCHOLESTEROLEMIA, UNSPECIFIED: ICD-10-CM

## 2024-05-10 DIAGNOSIS — R05.3 CHRONIC COUGH: ICD-10-CM

## 2024-05-10 PROCEDURE — G2211 COMPLEX E/M VISIT ADD ON: CPT

## 2024-05-10 PROCEDURE — 36415 COLL VENOUS BLD VENIPUNCTURE: CPT

## 2024-05-10 PROCEDURE — 99214 OFFICE O/P EST MOD 30 MIN: CPT

## 2024-05-10 NOTE — HISTORY OF PRESENT ILLNESS
[FreeTextEntry1] :  acute cough [de-identified] :  >3 months of intermittent cough - started off as an URI  no acid reflux symptoms  on Monday , cough and felt LH which resolved  did not take anything otc   in interval - saw cardiology 11/20/23 - had normal nuclear stress test

## 2024-05-10 NOTE — PHYSICAL EXAM
[No Acute Distress] : no acute distress [Well Nourished] : well nourished [PERRL] : pupils equal round and reactive to light [EOMI] : extraocular movements intact [Normal Oropharynx] : the oropharynx was normal [No Lymphadenopathy] : no lymphadenopathy [Supple] : supple [Thyroid Normal, No Nodules] : the thyroid was normal and there were no nodules present [No Accessory Muscle Use] : no accessory muscle use [Clear to Auscultation] : lungs were clear to auscultation bilaterally [No Edema] : there was no peripheral edema [Soft] : abdomen soft [Non Tender] : non-tender [Non-distended] : non-distended [No Masses] : no abdominal mass palpated [No HSM] : no HSM [Normal Bowel Sounds] : normal bowel sounds [Normal Affect] : the affect was normal [Normal Insight/Judgement] : insight and judgment were intact [de-identified] : +PND

## 2024-05-13 ENCOUNTER — TRANSCRIPTION ENCOUNTER (OUTPATIENT)
Age: 59
End: 2024-05-13

## 2024-05-13 LAB
ALBUMIN SERPL ELPH-MCNC: 4.6 G/DL
ALP BLD-CCNC: 62 U/L
ALT SERPL-CCNC: 35 U/L
ANION GAP SERPL CALC-SCNC: 11 MMOL/L
APPEARANCE: CLEAR
AST SERPL-CCNC: 20 U/L
BASOPHILS # BLD AUTO: 0.05 K/UL
BASOPHILS NFR BLD AUTO: 0.7 %
BILIRUB SERPL-MCNC: 0.6 MG/DL
BILIRUBIN URINE: NEGATIVE
BLOOD URINE: NEGATIVE
BUN SERPL-MCNC: 19 MG/DL
CALCIUM SERPL-MCNC: 9.5 MG/DL
CHLORIDE SERPL-SCNC: 102 MMOL/L
CHOLEST SERPL-MCNC: 156 MG/DL
CO2 SERPL-SCNC: 24 MMOL/L
COLOR: YELLOW
CREAT SERPL-MCNC: 0.99 MG/DL
EGFR: 87 ML/MIN/1.73M2
EOSINOPHIL # BLD AUTO: 0.2 K/UL
EOSINOPHIL NFR BLD AUTO: 2.9 %
ESTIMATED AVERAGE GLUCOSE: 120 MG/DL
GLUCOSE QUALITATIVE U: NEGATIVE MG/DL
GLUCOSE SERPL-MCNC: 85 MG/DL
HBA1C MFR BLD HPLC: 5.8 %
HCT VFR BLD CALC: 40.5 %
HDLC SERPL-MCNC: 55 MG/DL
HGB BLD-MCNC: 14.2 G/DL
IMM GRANULOCYTES NFR BLD AUTO: 0.1 %
KETONES URINE: NEGATIVE MG/DL
LDLC SERPL CALC-MCNC: 74 MG/DL
LEUKOCYTE ESTERASE URINE: NEGATIVE
LYMPHOCYTES # BLD AUTO: 2.58 K/UL
LYMPHOCYTES NFR BLD AUTO: 37.9 %
MAN DIFF?: NORMAL
MCHC RBC-ENTMCNC: 30.5 PG
MCHC RBC-ENTMCNC: 35.1 GM/DL
MCV RBC AUTO: 86.9 FL
MONOCYTES # BLD AUTO: 0.42 K/UL
MONOCYTES NFR BLD AUTO: 6.2 %
NEUTROPHILS # BLD AUTO: 3.55 K/UL
NEUTROPHILS NFR BLD AUTO: 52.2 %
NITRITE URINE: NEGATIVE
NONHDLC SERPL-MCNC: 101 MG/DL
PH URINE: 6.5
PLATELET # BLD AUTO: 257 K/UL
POTASSIUM SERPL-SCNC: 4 MMOL/L
PROT SERPL-MCNC: 7.3 G/DL
PROTEIN URINE: NEGATIVE MG/DL
RBC # BLD: 4.66 M/UL
RBC # FLD: 13 %
SODIUM SERPL-SCNC: 138 MMOL/L
SPECIFIC GRAVITY URINE: 1.01
T4 FREE SERPL-MCNC: 1.4 NG/DL
TRIGL SERPL-MCNC: 154 MG/DL
TSH SERPL-ACNC: 1.61 UIU/ML
UROBILINOGEN URINE: 0.2 MG/DL
WBC # FLD AUTO: 6.81 K/UL

## 2024-08-07 ENCOUNTER — NON-APPOINTMENT (OUTPATIENT)
Age: 59
End: 2024-08-07

## 2024-08-07 ENCOUNTER — APPOINTMENT (OUTPATIENT)
Dept: CARDIOLOGY | Facility: CLINIC | Age: 59
End: 2024-08-07

## 2024-08-07 PROBLEM — Z87.898 HISTORY OF SYNCOPE: Status: RESOLVED | Noted: 2020-05-07 | Resolved: 2024-08-07

## 2024-08-07 PROBLEM — Z86.79 HISTORY OF ABNORMAL ELECTROCARDIOGRAPHY: Status: RESOLVED | Noted: 2022-03-24 | Resolved: 2024-08-07

## 2024-08-07 PROBLEM — Z87.898 HISTORY OF DIZZINESS: Status: RESOLVED | Noted: 2022-11-30 | Resolved: 2024-08-07

## 2024-08-07 PROCEDURE — 93000 ELECTROCARDIOGRAM COMPLETE: CPT

## 2024-08-07 PROCEDURE — 99215 OFFICE O/P EST HI 40 MIN: CPT

## 2024-08-07 NOTE — ASSESSMENT
[FreeTextEntry1] : Overall doing well but room for improvement in controlling risk factors. Cholesterol is well controlled aside from mildly elevated TG though this was non-fasting and A1c in prediabetic range. His BP is elevated today even on repeat. He is on amlodipine 10mg qd and losartan 100mg qd. Discussed working on increasing his exercise. Also discussed considering sleep testing given his HTN and snoring which he will think about. Will consider testing for lp(a), apoB, and CACS for risk stratification and potentially lower LDL goal in the future. No changes to medication today  RTC in 6 months

## 2024-08-07 NOTE — PHYSICAL EXAM
[Well Developed] : well developed [Well Nourished] : well nourished [No Acute Distress] : no acute distress [Normal Venous Pressure] : normal venous pressure [No Carotid Bruit] : no carotid bruit [Normal S1, S2] : normal S1, S2 [No Murmur] : no murmur [No Rub] : no rub [No Gallop] : no gallop [Clear Lung Fields] : clear lung fields [Good Air Entry] : good air entry [No Respiratory Distress] : no respiratory distress  [Normal Gait] : normal gait [No Edema] : no edema [Moves all extremities] : moves all extremities [No Focal Deficits] : no focal deficits [Normal Speech] : normal speech [Alert and Oriented] : alert and oriented [Normal memory] : normal memory

## 2024-08-07 NOTE — HISTORY OF PRESENT ILLNESS
[FreeTextEntry1] : Mr. FARRELL is a 58 year male presenting for follow-up. Former patient of Dr. Juárez  Notable PMHx: - HTN - HLD - Prediabetes  - Mild MR - no family history of premature CAD - No Congenital Heart Disease  HPI:  Doing well, no complaints. At last visit with Dr. Lisker in 11/2023, he had experienced an episode of dizziness, nausea, and palpitations while sprinting to his car and later developed weakness. Was seen in ED with negative troponins and reportedly non-ischemic EKG. He was advised to stay for inpatient stress vs CCTA but opted to go home. Underwent nuclear stress test and TTE both of which were negative for ischemia. Denies chest pain, palpitations, light-headedness/dizziness, syncope, orthopnea, PND, ankle swelling, claudication, cough, fever/chills.  +snoring, no witnessed apneic episodes, wakes up feeling well rested (5-6 hours a night). 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 Left ventricular endocardium is not well visualized. Left ventricular systolic function is normal with an ejection fraction visually estimated at 65 to 70%. There are no regional wall motion abnormalities seen. There is normal geometry. There is normal left ventricular diastolic function. Normal right ventricular cavity size and systolic function. 2. Mild mitral regurgitation. 3. Mild tricuspid regurgitation. - Exercise Stress Nuc (12/2023): 1. Normal myocardial perfusion scan, with no evidence of infarction or inducible ischemia. 2. Patient achieved 10 METS, which is consistent with good exercise capacity. 3. Qualitative Perfusion: - small-sized, mild defect(s) in the basal inferolateral wall that is partially reversible, totally normalizes with prone imaging suggestive of diaphragmatic attenuation artifact.  4. The left ventricle is normal in function and normal in size. The left ventricular EF% during stress is 83 %. The stress end diastolic volume is 57 ml and systolic volume is 10 ml. 5. The Duke Treadmill Score is 9.00, which is consistent with low risk (=5) for future cardiac events. TTE (10/26/2017) - LVEF 65-70%, mild MR  - SHAYAN (2/13/2018) - 10'45" seconds Eric protocol (12 METS); 1.8 mm downsloping ST depressions III and 1.2 mm horizontal ST depressions aVF at peak exercise, resolving by 1 minute recovery; no echocardiographic evidence of ischemia.

## 2024-09-13 NOTE — H&P PST ADULT - VENOUS THROMBOEMBOLISM CURRENT STATUS
The patient is a 83y Male complaining of 
(1) other risk factor (includes escalating BMI, pack-years of smoking, diabetes requiring insulin, chemotherapy, female gender and length of surgery)/(2) malignancy (present or previous)

## 2024-11-25 ENCOUNTER — APPOINTMENT (OUTPATIENT)
Age: 59
End: 2024-11-25

## 2024-12-02 ENCOUNTER — APPOINTMENT (OUTPATIENT)
Age: 59
End: 2024-12-02

## 2024-12-02 PROCEDURE — 99212 OFFICE O/P EST SF 10 MIN: CPT

## 2025-02-10 ENCOUNTER — APPOINTMENT (OUTPATIENT)
Dept: CARDIOLOGY | Facility: CLINIC | Age: 60
End: 2025-02-10
Payer: COMMERCIAL

## 2025-02-10 ENCOUNTER — NON-APPOINTMENT (OUTPATIENT)
Age: 60
End: 2025-02-10

## 2025-02-10 VITALS
WEIGHT: 180 LBS | DIASTOLIC BLOOD PRESSURE: 80 MMHG | OXYGEN SATURATION: 98 % | HEART RATE: 77 BPM | SYSTOLIC BLOOD PRESSURE: 140 MMHG | BODY MASS INDEX: 28.19 KG/M2

## 2025-02-10 DIAGNOSIS — R06.83 SNORING: ICD-10-CM

## 2025-02-10 DIAGNOSIS — I34.0 NONRHEUMATIC MITRAL (VALVE) INSUFFICIENCY: ICD-10-CM

## 2025-02-10 DIAGNOSIS — E78.00 PURE HYPERCHOLESTEROLEMIA, UNSPECIFIED: ICD-10-CM

## 2025-02-10 DIAGNOSIS — I10 ESSENTIAL (PRIMARY) HYPERTENSION: ICD-10-CM

## 2025-02-10 PROCEDURE — 99214 OFFICE O/P EST MOD 30 MIN: CPT | Mod: 25

## 2025-02-10 PROCEDURE — 93000 ELECTROCARDIOGRAM COMPLETE: CPT

## 2025-02-12 LAB
ANION GAP SERPL CALC-SCNC: 9 MMOL/L
APO B SERPL-MCNC: 68 MG/DL
APO LP(A) SERPL-MCNC: 83.3 NMOL/L
BUN SERPL-MCNC: 16 MG/DL
CALCIUM SERPL-MCNC: 9.2 MG/DL
CHLORIDE SERPL-SCNC: 102 MMOL/L
CHOLEST SERPL-MCNC: 145 MG/DL
CO2 SERPL-SCNC: 27 MMOL/L
CREAT SERPL-MCNC: 1.1 MG/DL
EGFR: 76 ML/MIN/1.73M2
GLUCOSE SERPL-MCNC: 100 MG/DL
HDLC SERPL-MCNC: 51 MG/DL
LDLC SERPL CALC-MCNC: 76 MG/DL
NONHDLC SERPL-MCNC: 94 MG/DL
POTASSIUM SERPL-SCNC: 4.3 MMOL/L
SODIUM SERPL-SCNC: 139 MMOL/L
TRIGL SERPL-MCNC: 99 MG/DL

## 2025-02-18 RX ORDER — OLMESARTAN MEDOXOMIL 40 MG/1
40 TABLET, FILM COATED ORAL
Qty: 90 | Refills: 3 | Status: ACTIVE | COMMUNITY
Start: 2025-02-12 | End: 1900-01-01

## 2025-02-20 ENCOUNTER — APPOINTMENT (OUTPATIENT)
Dept: PULMONOLOGY | Facility: CLINIC | Age: 60
End: 2025-02-20
Payer: COMMERCIAL

## 2025-02-20 ENCOUNTER — APPOINTMENT (OUTPATIENT)
Dept: PULMONOLOGY | Facility: CLINIC | Age: 60
End: 2025-02-20

## 2025-02-20 VITALS
RESPIRATION RATE: 18 BRPM | OXYGEN SATURATION: 95 % | SYSTOLIC BLOOD PRESSURE: 142 MMHG | HEART RATE: 77 BPM | DIASTOLIC BLOOD PRESSURE: 96 MMHG | HEIGHT: 67.5 IN | BODY MASS INDEX: 27.61 KG/M2 | WEIGHT: 178 LBS

## 2025-02-20 DIAGNOSIS — G47.33 OBSTRUCTIVE SLEEP APNEA (ADULT) (PEDIATRIC): ICD-10-CM

## 2025-02-20 PROCEDURE — 99204 OFFICE O/P NEW MOD 45 MIN: CPT

## 2025-02-20 PROCEDURE — G2211 COMPLEX E/M VISIT ADD ON: CPT | Mod: NC

## 2025-03-04 ENCOUNTER — APPOINTMENT (OUTPATIENT)
Dept: INTERNAL MEDICINE | Facility: CLINIC | Age: 60
End: 2025-03-04
Payer: COMMERCIAL

## 2025-03-04 ENCOUNTER — NON-APPOINTMENT (OUTPATIENT)
Age: 60
End: 2025-03-04

## 2025-03-04 VITALS — BODY MASS INDEX: 27.15 KG/M2 | WEIGHT: 175 LBS | HEIGHT: 67.5 IN

## 2025-03-04 VITALS — SYSTOLIC BLOOD PRESSURE: 120 MMHG | DIASTOLIC BLOOD PRESSURE: 70 MMHG

## 2025-03-04 DIAGNOSIS — R68.82 DECREASED LIBIDO: ICD-10-CM

## 2025-03-04 DIAGNOSIS — I10 ESSENTIAL (PRIMARY) HYPERTENSION: ICD-10-CM

## 2025-03-04 DIAGNOSIS — E78.00 PURE HYPERCHOLESTEROLEMIA, UNSPECIFIED: ICD-10-CM

## 2025-03-04 DIAGNOSIS — R73.03 PREDIABETES.: ICD-10-CM

## 2025-03-04 DIAGNOSIS — Z87.898 PERSONAL HISTORY OF OTHER SPECIFIED CONDITIONS: ICD-10-CM

## 2025-03-04 DIAGNOSIS — Z00.00 ENCOUNTER FOR GENERAL ADULT MEDICAL EXAMINATION W/OUT ABNORMAL FINDINGS: ICD-10-CM

## 2025-03-04 LAB
APPEARANCE: CLEAR
BASOPHILS # BLD AUTO: 0.03 K/UL
BASOPHILS NFR BLD AUTO: 0.4 %
BILIRUBIN URINE: NEGATIVE
BLOOD URINE: NEGATIVE
COLOR: YELLOW
EOSINOPHIL # BLD AUTO: 0.26 K/UL
EOSINOPHIL NFR BLD AUTO: 3.8 %
ESTIMATED AVERAGE GLUCOSE: 126 MG/DL
GLUCOSE QUALITATIVE U: NEGATIVE MG/DL
HBA1C MFR BLD HPLC: 6 %
HCT VFR BLD CALC: 44.3 %
HGB BLD-MCNC: 15.5 G/DL
IMM GRANULOCYTES NFR BLD AUTO: 0.1 %
KETONES URINE: NEGATIVE MG/DL
LEUKOCYTE ESTERASE URINE: NEGATIVE
LYMPHOCYTES # BLD AUTO: 2.64 K/UL
LYMPHOCYTES NFR BLD AUTO: 38.8 %
MAN DIFF?: NORMAL
MCHC RBC-ENTMCNC: 30.9 PG
MCHC RBC-ENTMCNC: 35 G/DL
MCV RBC AUTO: 88.4 FL
MONOCYTES # BLD AUTO: 0.49 K/UL
MONOCYTES NFR BLD AUTO: 7.2 %
NEUTROPHILS # BLD AUTO: 3.37 K/UL
NEUTROPHILS NFR BLD AUTO: 49.7 %
NITRITE URINE: NEGATIVE
PH URINE: 6
PLATELET # BLD AUTO: 294 K/UL
PROTEIN URINE: NEGATIVE MG/DL
RBC # BLD: 5.01 M/UL
RBC # FLD: 12.6 %
SPECIFIC GRAVITY URINE: 1.02
UROBILINOGEN URINE: 0.2 MG/DL
WBC # FLD AUTO: 6.8 K/UL

## 2025-03-04 PROCEDURE — 99396 PREV VISIT EST AGE 40-64: CPT

## 2025-03-04 PROCEDURE — 36415 COLL VENOUS BLD VENIPUNCTURE: CPT

## 2025-03-04 PROCEDURE — 93000 ELECTROCARDIOGRAM COMPLETE: CPT

## 2025-03-05 LAB
ALBUMIN SERPL ELPH-MCNC: 4.8 G/DL
ALP BLD-CCNC: 66 U/L
ALT SERPL-CCNC: 29 U/L
ANION GAP SERPL CALC-SCNC: 15 MMOL/L
AST SERPL-CCNC: 21 U/L
BILIRUB SERPL-MCNC: 0.6 MG/DL
BUN SERPL-MCNC: 15 MG/DL
CALCIUM SERPL-MCNC: 9.3 MG/DL
CHLORIDE SERPL-SCNC: 99 MMOL/L
CO2 SERPL-SCNC: 26 MMOL/L
CREAT SERPL-MCNC: 1.13 MG/DL
CREAT SPEC-SCNC: 262 MG/DL
CREAT SPEC-SCNC: 262 MG/DL
CREAT/PROT UR: 0 RATIO
EGFRCR SERPLBLD CKD-EPI 2021: 74 ML/MIN/1.73M2
GLUCOSE SERPL-MCNC: 100 MG/DL
MICROALBUMIN 24H UR DL<=1MG/L-MCNC: 1.2 MG/DL
MICROALBUMIN/CREAT 24H UR-RTO: 5 MG/G
POTASSIUM SERPL-SCNC: 4.3 MMOL/L
PROT SERPL-MCNC: 7.7 G/DL
PROT UR-MCNC: 12 MG/DL
PSA SERPL-MCNC: 0.56 NG/ML
SODIUM SERPL-SCNC: 140 MMOL/L
T4 FREE SERPL-MCNC: 1.2 NG/DL
TSH SERPL-ACNC: 2.49 UIU/ML

## 2025-03-26 ENCOUNTER — TRANSCRIPTION ENCOUNTER (OUTPATIENT)
Age: 60
End: 2025-03-26

## 2025-04-21 ENCOUNTER — APPOINTMENT (OUTPATIENT)
Dept: SLEEP CENTER | Facility: CLINIC | Age: 60
End: 2025-04-21

## 2025-05-16 ENCOUNTER — APPOINTMENT (OUTPATIENT)
Dept: CARDIOLOGY | Facility: CLINIC | Age: 60
End: 2025-05-16
Payer: COMMERCIAL

## 2025-05-16 ENCOUNTER — NON-APPOINTMENT (OUTPATIENT)
Age: 60
End: 2025-05-16

## 2025-05-16 VITALS
SYSTOLIC BLOOD PRESSURE: 124 MMHG | DIASTOLIC BLOOD PRESSURE: 84 MMHG | HEART RATE: 67 BPM | HEIGHT: 67 IN | WEIGHT: 180 LBS | BODY MASS INDEX: 28.25 KG/M2

## 2025-05-16 DIAGNOSIS — Z87.898 PERSONAL HISTORY OF OTHER SPECIFIED CONDITIONS: ICD-10-CM

## 2025-05-16 DIAGNOSIS — I10 ESSENTIAL (PRIMARY) HYPERTENSION: ICD-10-CM

## 2025-05-16 DIAGNOSIS — Z23 ENCOUNTER FOR IMMUNIZATION: ICD-10-CM

## 2025-05-16 DIAGNOSIS — G47.33 OBSTRUCTIVE SLEEP APNEA (ADULT) (PEDIATRIC): ICD-10-CM

## 2025-05-16 DIAGNOSIS — E78.00 PURE HYPERCHOLESTEROLEMIA, UNSPECIFIED: ICD-10-CM

## 2025-05-16 DIAGNOSIS — I34.0 NONRHEUMATIC MITRAL (VALVE) INSUFFICIENCY: ICD-10-CM

## 2025-05-16 PROCEDURE — G2211 COMPLEX E/M VISIT ADD ON: CPT | Mod: NC

## 2025-05-16 PROCEDURE — 93000 ELECTROCARDIOGRAM COMPLETE: CPT

## 2025-05-16 PROCEDURE — 99214 OFFICE O/P EST MOD 30 MIN: CPT

## 2025-07-28 ENCOUNTER — APPOINTMENT (OUTPATIENT)
Dept: CARDIOLOGY | Facility: CLINIC | Age: 60
End: 2025-07-28
Payer: COMMERCIAL

## 2025-07-28 VITALS
SYSTOLIC BLOOD PRESSURE: 110 MMHG | WEIGHT: 178 LBS | DIASTOLIC BLOOD PRESSURE: 78 MMHG | HEART RATE: 80 BPM | BODY MASS INDEX: 27.88 KG/M2 | OXYGEN SATURATION: 95 %

## 2025-07-28 DIAGNOSIS — R73.03 PREDIABETES.: ICD-10-CM

## 2025-07-28 DIAGNOSIS — G47.33 OBSTRUCTIVE SLEEP APNEA (ADULT) (PEDIATRIC): ICD-10-CM

## 2025-07-28 DIAGNOSIS — E78.00 PURE HYPERCHOLESTEROLEMIA, UNSPECIFIED: ICD-10-CM

## 2025-07-28 DIAGNOSIS — I10 ESSENTIAL (PRIMARY) HYPERTENSION: ICD-10-CM

## 2025-07-28 LAB
25(OH)D3 SERPL-MCNC: 22.3 NG/ML
ALBUMIN SERPL ELPH-MCNC: 4.9 G/DL
ALP BLD-CCNC: 62 U/L
ALT SERPL-CCNC: 25 U/L
ANION GAP SERPL CALC-SCNC: 14 MMOL/L
AST SERPL-CCNC: 18 U/L
BASOPHILS # BLD AUTO: 0.05 K/UL
BASOPHILS NFR BLD AUTO: 0.8 %
BILIRUB SERPL-MCNC: 0.6 MG/DL
BUN SERPL-MCNC: 19 MG/DL
CALCIUM SERPL-MCNC: 9.5 MG/DL
CHLORIDE SERPL-SCNC: 101 MMOL/L
CHOLEST SERPL-MCNC: 146 MG/DL
CO2 SERPL-SCNC: 20 MMOL/L
CREAT SERPL-MCNC: 1.11 MG/DL
EGFRCR SERPLBLD CKD-EPI 2021: 76 ML/MIN/1.73M2
EOSINOPHIL # BLD AUTO: 0.25 K/UL
EOSINOPHIL NFR BLD AUTO: 3.9 %
GLUCOSE SERPL-MCNC: 97 MG/DL
HCT VFR BLD CALC: 41.4 %
HDLC SERPL-MCNC: 51 MG/DL
HGB BLD-MCNC: 14.5 G/DL
IMM GRANULOCYTES NFR BLD AUTO: 0.2 %
LDLC SERPL-MCNC: 72 MG/DL
LYMPHOCYTES # BLD AUTO: 3.05 K/UL
LYMPHOCYTES NFR BLD AUTO: 47.2 %
MAN DIFF?: NORMAL
MCHC RBC-ENTMCNC: 30.7 PG
MCHC RBC-ENTMCNC: 35 G/DL
MCV RBC AUTO: 87.5 FL
MONOCYTES # BLD AUTO: 0.49 K/UL
MONOCYTES NFR BLD AUTO: 7.6 %
NEUTROPHILS # BLD AUTO: 2.61 K/UL
NEUTROPHILS NFR BLD AUTO: 40.3 %
NONHDLC SERPL-MCNC: 95 MG/DL
PLATELET # BLD AUTO: 280 K/UL
POTASSIUM SERPL-SCNC: 4.6 MMOL/L
PROT SERPL-MCNC: 7.7 G/DL
RBC # BLD: 4.73 M/UL
RBC # FLD: 12.4 %
SODIUM SERPL-SCNC: 136 MMOL/L
TRIGL SERPL-MCNC: 131 MG/DL
WBC # FLD AUTO: 6.46 K/UL

## 2025-07-28 PROCEDURE — 99214 OFFICE O/P EST MOD 30 MIN: CPT

## 2025-07-28 PROCEDURE — G2211 COMPLEX E/M VISIT ADD ON: CPT | Mod: NC

## 2025-07-28 PROCEDURE — 93000 ELECTROCARDIOGRAM COMPLETE: CPT

## 2025-07-29 LAB
ESTIMATED AVERAGE GLUCOSE: 120 MG/DL
HBA1C MFR BLD HPLC: 5.8 %